# Patient Record
Sex: MALE | Race: WHITE | NOT HISPANIC OR LATINO | Employment: PART TIME | URBAN - NONMETROPOLITAN AREA
[De-identification: names, ages, dates, MRNs, and addresses within clinical notes are randomized per-mention and may not be internally consistent; named-entity substitution may affect disease eponyms.]

---

## 2017-01-18 ENCOUNTER — HOSPITAL ENCOUNTER (OUTPATIENT)
Dept: GASTROENTEROLOGY | Facility: HOSPITAL | Age: 62
Setting detail: HOSPITAL OUTPATIENT SURGERY
Discharge: HOME | End: 2017-01-18
Attending: SURGERY | Admitting: SURGERY

## 2017-05-16 ENCOUNTER — LAB (OUTPATIENT)
Dept: LAB | Facility: OTHER | Age: 62
End: 2017-05-16

## 2017-05-16 DIAGNOSIS — E78.2 MIXED HYPERLIPIDEMIA: Chronic | ICD-10-CM

## 2017-05-16 DIAGNOSIS — E11.9 TYPE 2 DIABETES MELLITUS WITHOUT COMPLICATION, WITHOUT LONG-TERM CURRENT USE OF INSULIN (HCC): ICD-10-CM

## 2017-05-16 DIAGNOSIS — I10 ESSENTIAL HYPERTENSION: ICD-10-CM

## 2017-05-16 DIAGNOSIS — Z12.5 SCREENING FOR PROSTATE CANCER: ICD-10-CM

## 2017-05-16 LAB
ALBUMIN SERPL-MCNC: 4.4 G/DL (ref 3.2–5.5)
ALBUMIN UR-MCNC: 0.9 MG/L
ALBUMIN/GLOB SERPL: 1.6 G/DL (ref 1–3)
ALP SERPL-CCNC: 72 U/L (ref 15–121)
ALT SERPL W P-5'-P-CCNC: 18 U/L (ref 10–60)
ANION GAP SERPL CALCULATED.3IONS-SCNC: 8 MMOL/L (ref 5–15)
AST SERPL-CCNC: 17 U/L (ref 10–60)
BACTERIA UR QL AUTO: ABNORMAL /HPF
BASOPHILS # BLD AUTO: 0.01 10*3/MM3 (ref 0–0.2)
BASOPHILS NFR BLD AUTO: 0.2 % (ref 0–2)
BILIRUB SERPL-MCNC: 1.4 MG/DL (ref 0.2–1)
BILIRUB UR QL STRIP: NEGATIVE
BUN BLD-MCNC: 19 MG/DL (ref 8–25)
BUN/CREAT SERPL: 19 (ref 7–25)
CALCIUM SPEC-SCNC: 9.3 MG/DL (ref 8.4–10.8)
CHLORIDE SERPL-SCNC: 103 MMOL/L (ref 100–112)
CHOLEST SERPL-MCNC: 145 MG/DL (ref 150–200)
CLARITY UR: CLEAR
CO2 SERPL-SCNC: 28 MMOL/L (ref 20–32)
COLOR UR: YELLOW
CREAT BLD-MCNC: 1 MG/DL (ref 0.4–1.3)
CREAT UR-MCNC: 145.4 MG/DL
DEPRECATED RDW RBC AUTO: 38.1 FL (ref 35.1–43.9)
EOSINOPHIL # BLD AUTO: 0.45 10*3/MM3 (ref 0–0.7)
EOSINOPHIL NFR BLD AUTO: 8 % (ref 0–7)
ERYTHROCYTE [DISTWIDTH] IN BLOOD BY AUTOMATED COUNT: 12.3 % (ref 11.5–14.5)
GFR SERPL CREATININE-BSD FRML MDRD: 75 ML/MIN/1.73 (ref 49–113)
GLOBULIN UR ELPH-MCNC: 2.7 GM/DL (ref 2.5–4.6)
GLUCOSE BLD-MCNC: 158 MG/DL (ref 70–100)
GLUCOSE UR STRIP-MCNC: NEGATIVE MG/DL
HBA1C MFR BLD: 8.08 % (ref 4–5.6)
HCT VFR BLD AUTO: 46 % (ref 39–49)
HDLC SERPL-MCNC: 48 MG/DL (ref 35–100)
HGB BLD-MCNC: 16.2 G/DL (ref 13.7–17.3)
HGB UR QL STRIP.AUTO: ABNORMAL
HYALINE CASTS UR QL AUTO: ABNORMAL /LPF
KETONES UR QL STRIP: ABNORMAL
LDLC SERPL CALC-MCNC: 85 MG/DL
LDLC/HDLC SERPL: 1.77 {RATIO}
LEUKOCYTE ESTERASE UR QL STRIP.AUTO: NEGATIVE
LYMPHOCYTES # BLD AUTO: 1.71 10*3/MM3 (ref 0.6–4.2)
LYMPHOCYTES NFR BLD AUTO: 30.2 % (ref 10–50)
MCH RBC QN AUTO: 30.1 PG (ref 26.5–34)
MCHC RBC AUTO-ENTMCNC: 35.2 G/DL (ref 31.5–36.3)
MCV RBC AUTO: 85.5 FL (ref 80–98)
MICROALBUMIN/CREAT UR: 6.2 MG/G (ref 0–30)
MONOCYTES # BLD AUTO: 0.63 10*3/MM3 (ref 0–0.9)
MONOCYTES NFR BLD AUTO: 11.1 % (ref 0–12)
MUCOUS THREADS URNS QL MICRO: ABNORMAL /HPF
NEUTROPHILS # BLD AUTO: 2.86 10*3/MM3 (ref 2–8.6)
NEUTROPHILS NFR BLD AUTO: 50.5 % (ref 37–80)
NITRITE UR QL STRIP: NEGATIVE
PH UR STRIP.AUTO: <=5 [PH] (ref 5.5–8)
PLATELET # BLD AUTO: 164 10*3/MM3 (ref 150–450)
PMV BLD AUTO: 9.8 FL (ref 8–12)
POTASSIUM BLD-SCNC: 4.1 MMOL/L (ref 3.4–5.4)
PROT SERPL-MCNC: 7.1 G/DL (ref 6.7–8.2)
PROT UR QL STRIP: NEGATIVE
RBC # BLD AUTO: 5.38 10*6/MM3 (ref 4.37–5.74)
RBC # UR: ABNORMAL /HPF
REF LAB TEST METHOD: ABNORMAL
SODIUM BLD-SCNC: 139 MMOL/L (ref 134–146)
SP GR UR STRIP: 1.02 (ref 1–1.03)
SQUAMOUS #/AREA URNS HPF: ABNORMAL /HPF
TRIGL SERPL-MCNC: 61 MG/DL (ref 35–160)
UROBILINOGEN UR QL STRIP: ABNORMAL
VLDLC SERPL-MCNC: 12.2 MG/DL
WBC NRBC COR # BLD: 5.66 10*3/MM3 (ref 3.2–9.8)
WBC UR QL AUTO: ABNORMAL /HPF

## 2017-05-16 PROCEDURE — 82570 ASSAY OF URINE CREATININE: CPT | Performed by: INTERNAL MEDICINE

## 2017-05-16 PROCEDURE — 82043 UR ALBUMIN QUANTITATIVE: CPT | Performed by: INTERNAL MEDICINE

## 2017-05-16 PROCEDURE — G0103 PSA SCREENING: HCPCS | Performed by: INTERNAL MEDICINE

## 2017-05-16 PROCEDURE — 83036 HEMOGLOBIN GLYCOSYLATED A1C: CPT | Performed by: INTERNAL MEDICINE

## 2017-05-16 PROCEDURE — 81001 URINALYSIS AUTO W/SCOPE: CPT | Performed by: INTERNAL MEDICINE

## 2017-05-16 PROCEDURE — 84443 ASSAY THYROID STIM HORMONE: CPT | Performed by: INTERNAL MEDICINE

## 2017-05-16 PROCEDURE — 84439 ASSAY OF FREE THYROXINE: CPT | Performed by: INTERNAL MEDICINE

## 2017-05-16 PROCEDURE — 85025 COMPLETE CBC W/AUTO DIFF WBC: CPT | Performed by: INTERNAL MEDICINE

## 2017-05-16 PROCEDURE — 80053 COMPREHEN METABOLIC PANEL: CPT | Performed by: INTERNAL MEDICINE

## 2017-05-16 PROCEDURE — 80061 LIPID PANEL: CPT | Performed by: INTERNAL MEDICINE

## 2017-05-17 LAB
PSA SERPL-MCNC: 1.44 NG/ML (ref 0–4)
T4 FREE SERPL-MCNC: 1.27 NG/DL (ref 0.78–2.19)
TSH SERPL DL<=0.05 MIU/L-ACNC: 2.94 MIU/ML (ref 0.46–4.68)

## 2017-05-23 ENCOUNTER — OFFICE VISIT (OUTPATIENT)
Dept: FAMILY MEDICINE CLINIC | Facility: CLINIC | Age: 62
End: 2017-05-23

## 2017-05-23 VITALS
SYSTOLIC BLOOD PRESSURE: 126 MMHG | WEIGHT: 140 LBS | DIASTOLIC BLOOD PRESSURE: 70 MMHG | HEIGHT: 67 IN | HEART RATE: 60 BPM | BODY MASS INDEX: 21.97 KG/M2

## 2017-05-23 DIAGNOSIS — E11.9 TYPE 2 DIABETES MELLITUS WITHOUT COMPLICATION, WITHOUT LONG-TERM CURRENT USE OF INSULIN (HCC): Primary | Chronic | ICD-10-CM

## 2017-05-23 DIAGNOSIS — Z11.59 NEED FOR HEPATITIS C SCREENING TEST: ICD-10-CM

## 2017-05-23 DIAGNOSIS — E78.2 MIXED HYPERLIPIDEMIA: Chronic | ICD-10-CM

## 2017-05-23 DIAGNOSIS — M79.672 PAIN OF LEFT HEEL: ICD-10-CM

## 2017-05-23 DIAGNOSIS — I10 ESSENTIAL HYPERTENSION: Chronic | ICD-10-CM

## 2017-05-23 PROCEDURE — 99214 OFFICE O/P EST MOD 30 MIN: CPT | Performed by: INTERNAL MEDICINE

## 2017-05-23 RX ORDER — ATENOLOL 25 MG/1
12.5 TABLET ORAL DAILY
Qty: 7 TABLET | Refills: 0 | Status: SHIPPED | OUTPATIENT
Start: 2017-05-23 | End: 2018-06-07

## 2017-05-23 RX ORDER — LOSARTAN POTASSIUM 50 MG/1
50 TABLET ORAL DAILY
Qty: 30 TABLET | Refills: 5 | Status: SHIPPED | OUTPATIENT
Start: 2017-05-23 | End: 2017-10-26 | Stop reason: SDUPTHER

## 2017-05-23 RX ORDER — GLIMEPIRIDE 4 MG/1
4 TABLET ORAL
Qty: 30 TABLET | Refills: 5 | Status: SHIPPED | OUTPATIENT
Start: 2017-05-23 | End: 2017-06-22 | Stop reason: DRUGHIGH

## 2017-06-22 ENCOUNTER — TELEPHONE (OUTPATIENT)
Dept: FAMILY MEDICINE CLINIC | Facility: CLINIC | Age: 62
End: 2017-06-22

## 2017-06-22 DIAGNOSIS — E11.9 TYPE 2 DIABETES MELLITUS WITHOUT COMPLICATION, WITHOUT LONG-TERM CURRENT USE OF INSULIN (HCC): Chronic | ICD-10-CM

## 2017-06-22 NOTE — TELEPHONE ENCOUNTER
Patient has been notified, will D/C Glimepiride and replace it with Januvia 50mg daily. Will monitor FSBS and notify the office with any concerns and effectiveness of the Januiva, verbalized understanding.

## 2017-06-22 NOTE — TELEPHONE ENCOUNTER
----- Message from Kayla Blanco sent at 6/22/2017 12:32 PM CDT -----  Regarding: about his medication  Contact: [phone]   Patient came to the window and said that the Glimepiride 4 mg that the Dr has him on, his sugar is dropping down to 67. He said that he cut the pill in half and took it for one week and his sugar was still 67?

## 2017-07-10 ENCOUNTER — OFFICE VISIT (OUTPATIENT)
Dept: PODIATRY | Facility: CLINIC | Age: 62
End: 2017-07-10

## 2017-07-10 VITALS — BODY MASS INDEX: 21.66 KG/M2 | WEIGHT: 138 LBS | HEIGHT: 67 IN

## 2017-07-10 DIAGNOSIS — M79.672 LEFT FOOT PAIN: ICD-10-CM

## 2017-07-10 DIAGNOSIS — M24.573 EQUINUS CONTRACTURE OF ANKLE: ICD-10-CM

## 2017-07-10 DIAGNOSIS — M72.2 PLANTAR FASCIITIS: Primary | ICD-10-CM

## 2017-07-10 PROCEDURE — 99203 OFFICE O/P NEW LOW 30 MIN: CPT | Performed by: PODIATRIST

## 2017-07-10 RX ORDER — MELOXICAM 15 MG/1
15 TABLET ORAL DAILY
Qty: 30 TABLET | Refills: 0 | Status: SHIPPED | OUTPATIENT
Start: 2017-07-10 | End: 2017-11-28

## 2017-07-10 NOTE — PROGRESS NOTES
Lui Miner  1955  62 y.o. male   Patient presents today with left heel pain.   PCP: Kaiser Hassan MD  BS: 139 on 7/8/2017    07/10/2017  Chief Complaint   Patient presents with   • Left Foot - Pain           History of Present Illness    Lui Miner is a 62 y.o. male who presents for evaluation of left foot pain.  States the pain is primarily located in his heel and arch.  States is been ongoing and worsening for several months.  He does have previous history of similar pain several years prior in the right foot which resolved following a corticosteroid injection.  He describes his pain as sharp and worse with weightbearing.  States that he has received injection in this left foot but not receive relief from this.  He denies any trauma or injuries.  He denies any other formal treatments.        Past Medical History:   Diagnosis Date   • Essential hypertension    • Mixed hyperlipidemia    • Type 2 diabetes mellitus without complication, without long-term current use of insulin          Past Surgical History:   Procedure Laterality Date   • COLONOSCOPY      Dr. Bynum   • HERNIA REPAIR     • INGUINAL HERNIA REPAIR Right    • KIDNEY STONE SURGERY      remove kidney stone and lithotripsy x 1         Family History   Problem Relation Age of Onset   • Breast cancer Mother    • Hypertension Mother    • Hypertension Sister    • Hypertension Sister          Social History     Social History   • Marital status:      Spouse name: N/A   • Number of children: N/A   • Years of education: N/A     Occupational History   • Not on file.     Social History Main Topics   • Smoking status: Never Smoker   • Smokeless tobacco: Never Used   • Alcohol use No   • Drug use: No   • Sexual activity: Defer     Other Topics Concern   • Not on file     Social History Narrative         Current Outpatient Prescriptions   Medication Sig Dispense Refill   • atenolol (TENORMIN) 25 MG tablet Take 0.5 tablets by mouth  "Daily. For one week and then stop. 7 tablet 0   • atorvastatin (LIPITOR) 20 MG tablet Take 20 mg by mouth Every Night.     • Blood Glucose Monitoring Suppl (ACURA BLOOD GLUCOSE METER) W/DEVICE kit to test FSBS 1 time per day as directed.DX:DM/E11.9, #1 glucometer, #50 strips, #100 lancets     • linagliptin (TRADJENTA) 5 MG tablet tablet Take 1 tablet by mouth Daily. 30 tablet 2   • losartan (COZAAR) 50 MG tablet Take 1 tablet by mouth Daily. 30 tablet 5   • meloxicam (MOBIC) 15 MG tablet Take 1 tablet by mouth Daily. Take once daily. 30 tablet 0     No current facility-administered medications for this visit.          OBJECTIVE    Ht 67\" (170.2 cm)  Wt 138 lb (62.6 kg)  BMI 21.61 kg/m2      Review of Systems   Constitutional:  Denies recent weight loss, weight gain, fever or chills, no change in exercise tolerance  Musculoskeletal: foot pain.   Skin: No wounds or lesions  Neurological: Denies paresthesias.  Psychiatric/Behavioral: Denies depression  Physical Exam   Constitutional: he appears well-developed and well-nourished.   HEENT: Normocephalic. Atraumatic.  CV: No CP. RRR  Resp: Non-labored respirations.  Psychiatric: he has a normal mood and affect. his behavior is normal.         Lower Extremity Exam:  Vascular: DP/PT pulses palpable 2+.   Minimal plantar heel edema, left  Foot warm  Neuro: Protective sensation intact, b/l.  DTRs intact  Negative Tinel over tarsal tunnel  Integument: No open wounds or lesions.  No erythema, scaling  No masses  Musculoskeletal: LE muscle strength 5/5.   Gait normal  Ankle ROM decreased without pain or crepitus  STJ ROM full without pain or crepitus  Pain on palpation of plantar calcaneal tubercles, medial plantar fascial band, left  Minimal tenderness to lateral compression of calcaneus, left            ASSESSMENT AND PLAN    Lui was seen today for pain.    Diagnoses and all orders for this visit:    Plantar fasciitis    Left foot pain    Equinus contracture of " ankle    Other orders  -     meloxicam (MOBIC) 15 MG tablet; Take 1 tablet by mouth Daily. Take once daily.    -Comprehensive foot and ankle exam performed  -Radiographs reviewed  -Educated pt on diagnosis, etiology and treatment of plantar fasciitis.  -Continue motion control shoe  -Recommend power step over-the-counter inserts  -Aggressive stretching regimen, patient education materials dispensed  -Rx Meloxicam 15 mg qday, not to be taken with other NSAIDs  -Corticosteroid injection in the future if necessary  -Recheck 4 weeks            This document has been electronically signed by Mannie Collins DPM on July 12, 2017 4:22 PM     EMR Dragon/Transcription disclaimer:   Much of this encounter note is an electronic transcription/translation of spoken language to printed text. The electronic translation of spoken language may permit erroneous, or at times, nonsensical words or phrases to be inadvertently transcribed; Although I have reviewed the note for such errors, some may still exist.    Mannie Collins DPM  7/12/2017  4:22 PM

## 2017-07-20 RX ORDER — ATORVASTATIN CALCIUM 20 MG/1
20 TABLET, FILM COATED ORAL NIGHTLY
Qty: 30 TABLET | Refills: 5 | Status: SHIPPED | OUTPATIENT
Start: 2017-07-20 | End: 2018-06-07 | Stop reason: SDUPTHER

## 2017-08-07 ENCOUNTER — OFFICE VISIT (OUTPATIENT)
Dept: PODIATRY | Facility: CLINIC | Age: 62
End: 2017-08-07

## 2017-08-07 VITALS — BODY MASS INDEX: 21.66 KG/M2 | HEIGHT: 67 IN | WEIGHT: 138 LBS

## 2017-08-07 DIAGNOSIS — M72.2 PLANTAR FASCIITIS: Primary | ICD-10-CM

## 2017-08-07 DIAGNOSIS — M24.573 EQUINUS CONTRACTURE OF ANKLE: ICD-10-CM

## 2017-08-07 PROCEDURE — 99212 OFFICE O/P EST SF 10 MIN: CPT | Performed by: PODIATRIST

## 2017-08-07 NOTE — PROGRESS NOTES
Lui Miner  1955  62 y.o. male   Patient presents today with left heel pain.   PCP: Kaiser Hassan MD  BS: 150 yesterday morning  08/07/2017    Chief Complaint   Patient presents with   • Left Foot - Follow-up           History of Present Illness    Lui Miner is a 62 y.o. male who presents for f/u evaluation of left foot pain related to plantar fasciitis. Doing well with no pain today.      Past Medical History:   Diagnosis Date   • Essential hypertension    • Mixed hyperlipidemia    • Type 2 diabetes mellitus without complication, without long-term current use of insulin          Past Surgical History:   Procedure Laterality Date   • COLONOSCOPY      Dr. Bynum   • HERNIA REPAIR     • INGUINAL HERNIA REPAIR Right    • KIDNEY STONE SURGERY      remove kidney stone and lithotripsy x 1         Family History   Problem Relation Age of Onset   • Breast cancer Mother    • Hypertension Mother    • Hypertension Sister    • Hypertension Sister          Social History     Social History   • Marital status:      Spouse name: N/A   • Number of children: N/A   • Years of education: N/A     Occupational History   • Not on file.     Social History Main Topics   • Smoking status: Never Smoker   • Smokeless tobacco: Never Used   • Alcohol use No   • Drug use: No   • Sexual activity: Defer     Other Topics Concern   • Not on file     Social History Narrative         Current Outpatient Prescriptions   Medication Sig Dispense Refill   • atenolol (TENORMIN) 25 MG tablet Take 0.5 tablets by mouth Daily. For one week and then stop. 7 tablet 0   • atorvastatin (LIPITOR) 20 MG tablet Take 1 tablet by mouth Every Night. 30 tablet 5   • Blood Glucose Monitoring Suppl (ACURA BLOOD GLUCOSE METER) W/DEVICE kit to test FSBS 1 time per day as directed.DX:DM/E11.9, #1 glucometer, #50 strips, #100 lancets     • linagliptin (TRADJENTA) 5 MG tablet tablet Take 1 tablet by mouth Daily. 30 tablet 2   • losartan (COZAAR)  "50 MG tablet Take 1 tablet by mouth Daily. 30 tablet 5   • meloxicam (MOBIC) 15 MG tablet Take 1 tablet by mouth Daily. Take once daily. 30 tablet 0     No current facility-administered medications for this visit.          OBJECTIVE    Ht 67\" (170.2 cm)  Wt 138 lb (62.6 kg)  BMI 21.61 kg/m2      Review of Systems   Constitutional:  Denies recent weight loss, weight gain, fever or chills, no change in exercise tolerance  Musculoskeletal: foot pain.   Skin: No wounds or lesions  Neurological: Denies paresthesias.  Psychiatric/Behavioral: Denies depression    Physical Exam   Constitutional: he appears well-developed and well-nourished.   HEENT: Normocephalic. Atraumatic.  CV: No CP. RRR  Resp: Non-labored respirations.  Psychiatric: he has a normal mood and affect. his behavior is normal.         Lower Extremity Exam:  Vascular: DP/PT pulses palpable 2+.   No edema  Foot warm  Neuro: Protective sensation intact, b/l.  DTRs intact  Negative Tinel over tarsal tunnel  Integument: No open wounds or lesions.  No erythema, scaling  No masses  Musculoskeletal: LE muscle strength 5/5.   Gait normal  Ankle ROM decreased without pain or crepitus  STJ ROM full without pain or crepitus  No pain on palpation of plantar calcaneal tubercles, medial plantar fascial band, left  Minimal tenderness to lateral compression of calcaneus, left            ASSESSMENT AND PLAN    Lui was seen today for follow-up.    Diagnoses and all orders for this visit:    Plantar fasciitis    Equinus contracture of ankle    -Comprehensive foot and ankle exam performed  -Educated pt on diagnosis, etiology and treatment of plantar fasciitis.  -Continue motion control shoe, power step over-the-counter inserts, stretching regimen.  -NSAIDs as needed  -Recheck as needed            This document has been electronically signed by Mannie Collins DPM on August 13, 2017 9:33 AM     EMR Dragon/Transcription disclaimer:   Much of this encounter note is an " electronic transcription/translation of spoken language to printed text. The electronic translation of spoken language may permit erroneous, or at times, nonsensical words or phrases to be inadvertently transcribed; Although I have reviewed the note for such errors, some may still exist.    Mannie Collins DPM  8/13/2017  9:33 AM

## 2017-10-26 DIAGNOSIS — I10 ESSENTIAL HYPERTENSION: Chronic | ICD-10-CM

## 2017-10-26 RX ORDER — LOSARTAN POTASSIUM 50 MG/1
TABLET ORAL
Qty: 30 TABLET | Refills: 5 | Status: SHIPPED | OUTPATIENT
Start: 2017-10-26 | End: 2018-05-31 | Stop reason: SDUPTHER

## 2017-11-21 ENCOUNTER — LAB (OUTPATIENT)
Dept: LAB | Facility: OTHER | Age: 62
End: 2017-11-21

## 2017-11-21 DIAGNOSIS — Z11.59 NEED FOR HEPATITIS C SCREENING TEST: ICD-10-CM

## 2017-11-21 DIAGNOSIS — E11.9 TYPE 2 DIABETES MELLITUS WITHOUT COMPLICATION, WITHOUT LONG-TERM CURRENT USE OF INSULIN (HCC): Chronic | ICD-10-CM

## 2017-11-21 DIAGNOSIS — I10 ESSENTIAL HYPERTENSION: ICD-10-CM

## 2017-11-21 DIAGNOSIS — E78.2 MIXED HYPERLIPIDEMIA: Chronic | ICD-10-CM

## 2017-11-21 LAB
ALBUMIN SERPL-MCNC: 4.3 G/DL (ref 3.2–5.5)
ALBUMIN/GLOB SERPL: 1.5 G/DL (ref 1–3)
ALP SERPL-CCNC: 67 U/L (ref 15–121)
ALT SERPL W P-5'-P-CCNC: 15 U/L (ref 10–60)
ANION GAP SERPL CALCULATED.3IONS-SCNC: 10 MMOL/L (ref 5–15)
AST SERPL-CCNC: 17 U/L (ref 10–60)
BASOPHILS # BLD AUTO: 0.03 10*3/MM3 (ref 0–0.2)
BASOPHILS NFR BLD AUTO: 0.5 % (ref 0–2)
BILIRUB SERPL-MCNC: 1 MG/DL (ref 0.2–1)
BILIRUB UR QL STRIP: NEGATIVE
BUN BLD-MCNC: 18 MG/DL (ref 8–25)
BUN/CREAT SERPL: 20 (ref 7–25)
CALCIUM SPEC-SCNC: 9.2 MG/DL (ref 8.4–10.8)
CHLORIDE SERPL-SCNC: 104 MMOL/L (ref 100–112)
CHOLEST SERPL-MCNC: 148 MG/DL (ref 150–200)
CLARITY UR: CLEAR
CO2 SERPL-SCNC: 28 MMOL/L (ref 20–32)
COLOR UR: YELLOW
CREAT BLD-MCNC: 0.9 MG/DL (ref 0.4–1.3)
DEPRECATED RDW RBC AUTO: 39.3 FL (ref 35.1–43.9)
EOSINOPHIL # BLD AUTO: 0.3 10*3/MM3 (ref 0–0.7)
EOSINOPHIL NFR BLD AUTO: 4.9 % (ref 0–7)
ERYTHROCYTE [DISTWIDTH] IN BLOOD BY AUTOMATED COUNT: 12.4 % (ref 11.5–14.5)
GFR SERPL CREATININE-BSD FRML MDRD: 85 ML/MIN/1.73 (ref 49–113)
GLOBULIN UR ELPH-MCNC: 2.8 GM/DL (ref 2.5–4.6)
GLUCOSE BLD-MCNC: 146 MG/DL (ref 70–100)
GLUCOSE UR STRIP-MCNC: ABNORMAL MG/DL
HBA1C MFR BLD: 7.5 % (ref 4–5.6)
HCT VFR BLD AUTO: 46 % (ref 39–49)
HDLC SERPL-MCNC: 48 MG/DL (ref 35–100)
HGB BLD-MCNC: 15.7 G/DL (ref 13.7–17.3)
HGB UR QL STRIP.AUTO: NEGATIVE
KETONES UR QL STRIP: ABNORMAL
LDLC SERPL CALC-MCNC: 85 MG/DL
LDLC/HDLC SERPL: 1.78 {RATIO}
LEUKOCYTE ESTERASE UR QL STRIP.AUTO: NEGATIVE
LYMPHOCYTES # BLD AUTO: 1.53 10*3/MM3 (ref 0.6–4.2)
LYMPHOCYTES NFR BLD AUTO: 25 % (ref 10–50)
MCH RBC QN AUTO: 29.9 PG (ref 26.5–34)
MCHC RBC AUTO-ENTMCNC: 34.1 G/DL (ref 31.5–36.3)
MCV RBC AUTO: 87.6 FL (ref 80–98)
MONOCYTES # BLD AUTO: 0.72 10*3/MM3 (ref 0–0.9)
MONOCYTES NFR BLD AUTO: 11.8 % (ref 0–12)
NEUTROPHILS # BLD AUTO: 3.53 10*3/MM3 (ref 2–8.6)
NEUTROPHILS NFR BLD AUTO: 57.8 % (ref 37–80)
NITRITE UR QL STRIP: NEGATIVE
PH UR STRIP.AUTO: 5.5 [PH] (ref 5.5–8)
PLATELET # BLD AUTO: 185 10*3/MM3 (ref 150–450)
PMV BLD AUTO: 9.7 FL (ref 8–12)
POTASSIUM BLD-SCNC: 4.6 MMOL/L (ref 3.4–5.4)
PROT SERPL-MCNC: 7.1 G/DL (ref 6.7–8.2)
PROT UR QL STRIP: NEGATIVE
RBC # BLD AUTO: 5.25 10*6/MM3 (ref 4.37–5.74)
SODIUM BLD-SCNC: 142 MMOL/L (ref 134–146)
SP GR UR STRIP: 1.02 (ref 1–1.03)
T4 FREE SERPL-MCNC: 1.24 NG/DL (ref 0.78–2.19)
TRIGL SERPL-MCNC: 73 MG/DL (ref 35–160)
TSH SERPL DL<=0.05 MIU/L-ACNC: 3.18 MIU/ML (ref 0.46–4.68)
UROBILINOGEN UR QL STRIP: ABNORMAL
VLDLC SERPL-MCNC: 14.6 MG/DL
WBC NRBC COR # BLD: 6.11 10*3/MM3 (ref 3.2–9.8)

## 2017-11-21 PROCEDURE — 84443 ASSAY THYROID STIM HORMONE: CPT | Performed by: INTERNAL MEDICINE

## 2017-11-21 PROCEDURE — 85025 COMPLETE CBC W/AUTO DIFF WBC: CPT | Performed by: INTERNAL MEDICINE

## 2017-11-21 PROCEDURE — 80061 LIPID PANEL: CPT | Performed by: INTERNAL MEDICINE

## 2017-11-21 PROCEDURE — 81003 URINALYSIS AUTO W/O SCOPE: CPT | Performed by: INTERNAL MEDICINE

## 2017-11-21 PROCEDURE — 80074 ACUTE HEPATITIS PANEL: CPT | Performed by: INTERNAL MEDICINE

## 2017-11-21 PROCEDURE — 84439 ASSAY OF FREE THYROXINE: CPT | Performed by: INTERNAL MEDICINE

## 2017-11-21 PROCEDURE — 80053 COMPREHEN METABOLIC PANEL: CPT | Performed by: INTERNAL MEDICINE

## 2017-11-21 PROCEDURE — 83036 HEMOGLOBIN GLYCOSYLATED A1C: CPT | Performed by: INTERNAL MEDICINE

## 2017-11-22 LAB
HAV IGM SERPL QL IA: NEGATIVE
HBV CORE IGM SERPL QL IA: NEGATIVE
HBV SURFACE AG SERPL QL IA: NEGATIVE
HCV AB SER DONR QL: NEGATIVE

## 2017-11-28 ENCOUNTER — OFFICE VISIT (OUTPATIENT)
Dept: FAMILY MEDICINE CLINIC | Facility: CLINIC | Age: 62
End: 2017-11-28

## 2017-11-28 VITALS
HEIGHT: 67 IN | TEMPERATURE: 97.8 F | HEART RATE: 64 BPM | WEIGHT: 144 LBS | BODY MASS INDEX: 22.6 KG/M2 | SYSTOLIC BLOOD PRESSURE: 126 MMHG | DIASTOLIC BLOOD PRESSURE: 76 MMHG

## 2017-11-28 DIAGNOSIS — Z12.5 SCREENING FOR PROSTATE CANCER: ICD-10-CM

## 2017-11-28 DIAGNOSIS — E78.2 MIXED HYPERLIPIDEMIA: Chronic | ICD-10-CM

## 2017-11-28 DIAGNOSIS — E11.9 TYPE 2 DIABETES MELLITUS WITHOUT COMPLICATION, WITHOUT LONG-TERM CURRENT USE OF INSULIN (HCC): Primary | Chronic | ICD-10-CM

## 2017-11-28 DIAGNOSIS — I10 ESSENTIAL HYPERTENSION: Chronic | ICD-10-CM

## 2017-11-28 PROCEDURE — 99214 OFFICE O/P EST MOD 30 MIN: CPT | Performed by: INTERNAL MEDICINE

## 2017-11-28 RX ORDER — GLIMEPIRIDE 1 MG/1
1 TABLET ORAL
Qty: 30 TABLET | Refills: 5 | Status: SHIPPED | OUTPATIENT
Start: 2017-11-28 | End: 2018-06-07 | Stop reason: SDUPTHER

## 2017-11-28 NOTE — PROGRESS NOTES
Chief Complaint   Patient presents with   • Hyperlipidemia   • Diabetes     checks fsbs q week and prn   • Hypertension     Subjective   Lui Miner is a 62 y.o. male who presents to the office for follow-up and review of labs. He has diabetes, and at the last visit his hemoglobin A1c was elevated.  He was having side effects from metformin, so this was discontinued.  I started him on glimepiride.  He was taking a 4 mg dose which was causing his blood sugars bottom out.  He then tried a 2 mg dose which did the same.  The glimepiride was discontinued and he was placed on Januvia.  The insurance then requested change to Tradjenta.  He has been tolerating the Tradjenta with no side effects.  He has noticed that his blood sugar is still running a little high at times.  He has hypertension and his blood pressure has been well controlled.  He has hyperlipidemia and continues to take Lipitor daily.    The following portions of the patient's history were reviewed and updated as appropriate: allergies, current medications, past family history, past medical history, past social history, past surgical history and problem list.    Review of Systems   Constitutional: Negative for chills, fatigue and fever.   HENT: Negative for congestion, sneezing, sore throat and trouble swallowing.    Eyes: Negative for visual disturbance.   Respiratory: Negative for cough, chest tightness, shortness of breath and wheezing.    Cardiovascular: Negative for chest pain, palpitations and leg swelling.   Gastrointestinal: Negative for abdominal pain, constipation, diarrhea, nausea and vomiting.   Genitourinary: Negative for dysuria, frequency and urgency.   Musculoskeletal: Negative for neck pain.   Skin: Negative for rash.   Neurological: Negative for dizziness, weakness and headaches.   Psychiatric/Behavioral:        Patient denies any feelings of depression and has not felt down, hopeless or lost interest in any activities.   All other  "systems reviewed and are negative.      Objective   Vitals:    11/28/17 0804   BP: 126/76   BP Location: Left arm   Patient Position: Sitting   Cuff Size: Adult   Pulse: 64   Temp: 97.8 °F (36.6 °C)   TempSrc: Oral   Weight: 144 lb (65.3 kg)   Height: 67\" (170.2 cm)   PainSc: 0-No pain     Physical Exam   Constitutional: He is oriented to person, place, and time. He appears well-developed and well-nourished. No distress.   HENT:   Head: Normocephalic and atraumatic.   Nose: Nose normal.   Mouth/Throat: Oropharynx is clear and moist. No oropharyngeal exudate.   Eyes: Conjunctivae and EOM are normal. Pupils are equal, round, and reactive to light. No scleral icterus.   Neck: Normal range of motion. Neck supple.   Cardiovascular: Normal rate, regular rhythm and normal heart sounds.  Exam reveals no gallop and no friction rub.    No murmur heard.  Pulmonary/Chest: Effort normal and breath sounds normal. No respiratory distress. He has no wheezes. He has no rales.   Abdominal: Soft. Bowel sounds are normal. He exhibits no distension. There is no tenderness. There is no rebound and no guarding.   Musculoskeletal: Normal range of motion. He exhibits no edema.    Lui had a diabetic foot exam performed today.   During the foot exam he had a monofilament test performed.    Neurological Sensory Findings - Unaltered hot/cold right ankle/foot discrimination and unaltered hot/cold left ankle/foot discrimination. Unaltered sharp/dull right ankle/foot discrimination and unaltered sharp/dull left ankle/foot discrimination. No right ankle/foot altered proprioception and no left ankle/foot altered proprioception    Vascular Status -  His exam exhibits right foot vasculature normal. His exam exhibits no right foot edema. His exam exhibits left foot vasculature normal. His exam exhibits no left foot edema.   Skin Integrity  -  His right foot skin is intact.     Lui 's left foot skin is intact. .   Foot Structure and Biomechanics - "  His right foot has no claw toes and no hammer toes present. His left foot has no claw toes and no hammer toes.      Lymphadenopathy:     He has no cervical adenopathy.   Neurological: He is alert and oriented to person, place, and time. No cranial nerve deficit.   Skin: Skin is warm and dry. No rash noted.   Psychiatric: He has a normal mood and affect. His behavior is normal. Judgment and thought content normal.   Nursing note and vitals reviewed.      Assessment/Plan   Lui was seen today for hyperlipidemia, diabetes and hypertension.    Diagnoses and all orders for this visit:    Type 2 diabetes mellitus without complication, without long-term current use of insulin  -     Hemoglobin A1c; Future  -     Microalbumin / Creatinine Urine Ratio - Urine, Clean Catch; Future    Essential hypertension  -     CBC & Differential; Future  -     Comprehensive Metabolic Panel; Future  -     T4, Free; Future  -     TSH; Future  -     Urinalysis With / Culture If Indicated - Urine, Clean Catch; Future    Mixed hyperlipidemia  -     Lipid Panel; Future    Screening for prostate cancer  -     PSA Screen; Future         Labs are reviewed with patient.  His glucose and hemoglobin A1c are still a little elevated, but it has improved quite a bit from the previous visit.  He will continue with the current dose of Tradjenta.  I will add glimepiride 1 mg to take 1/2-1 tablet daily.  He will let me know if this causes any hypoglycemic events.  I also discussed dietary measures to help with control of the diabetes.  He may monitor blood sugar one time daily.  His lipids are at goal and he will continue with Lipitor.  His blood pressure is well controlled and he will continue with losartan.    Patient does not want a flu shot today.  He also refuses pneumonia vaccine.  I have reminded him of the need for an annual diabetic eye exam.    PHQ-2/PHQ-9 Depression Screening 11/28/2017   Little interest or pleasure in doing things 0   Feeling  down, depressed, or hopeless 0   Trouble falling or staying asleep, or sleeping too much 0   Feeling tired or having little energy 0   Poor appetite or overeating 0   Feeling bad about yourself - or that you are a failure or have let yourself or your family down 0   Trouble concentrating on things, such as reading the newspaper or watching television 0   Moving or speaking so slowly that other people could have noticed. Or the opposite - being so fidgety or restless that you have been moving around a lot more than usual 0   Thoughts that you would be better off dead, or of hurting yourself in some way 0   Total Score 0         Lab on 11/21/2017   Component Date Value Ref Range Status   • Glucose 11/21/2017 146* 70 - 100 mg/dL Final   • BUN 11/21/2017 18  8 - 25 mg/dL Final   • Creatinine 11/21/2017 0.90  0.40 - 1.30 mg/dL Final   • Sodium 11/21/2017 142  134 - 146 mmol/L Final   • Potassium 11/21/2017 4.6  3.4 - 5.4 mmol/L Final   • Chloride 11/21/2017 104  100 - 112 mmol/L Final   • CO2 11/21/2017 28.0  20.0 - 32.0 mmol/L Final   • Calcium 11/21/2017 9.2  8.4 - 10.8 mg/dL Final   • Total Protein 11/21/2017 7.1  6.7 - 8.2 g/dL Final   • Albumin 11/21/2017 4.30  3.20 - 5.50 g/dL Final   • ALT (SGPT) 11/21/2017 15  10 - 60 U/L Final   • AST (SGOT) 11/21/2017 17  10 - 60 U/L Final   • Alkaline Phosphatase 11/21/2017 67  15 - 121 U/L Final   • Total Bilirubin 11/21/2017 1.0  0.2 - 1.0 mg/dL Final   • eGFR Non African Amer 11/21/2017 85  49 - 113 mL/min/1.73 Final   • Globulin 11/21/2017 2.8  2.5 - 4.6 gm/dL Final   • A/G Ratio 11/21/2017 1.5  1.0 - 3.0 g/dL Final   • BUN/Creatinine Ratio 11/21/2017 20.0  7.0 - 25.0 Final   • Anion Gap 11/21/2017 10.0  5.0 - 15.0 mmol/L Final   • Hemoglobin A1C 11/21/2017 7.5* 4 - 5.6 % Final   • Hepatitis C Ab 11/21/2017 Negative  Negative Final   • Hep A IgM 11/21/2017 Negative  Negative Final   • Hep B C IgM 11/21/2017 Negative  Negative Final   • Hepatitis B Surface Ag 11/21/2017  Negative  Negative Final   • Total Cholesterol 11/21/2017 148* 150 - 200 mg/dL Final   • Triglycerides 11/21/2017 73  35 - 160 mg/dL Final   • HDL Cholesterol 11/21/2017 48  35 - 100 mg/dL Final   • LDL Cholesterol  11/21/2017 85  mg/dL Final   • VLDL Cholesterol 11/21/2017 14.6  mg/dL Final   • LDL/HDL Ratio 11/21/2017 1.78   Final   • Free T4 11/21/2017 1.24  0.78 - 2.19 ng/dL Final   • TSH 11/21/2017 3.180  0.460 - 4.680 mIU/mL Final   • Color, UA 11/21/2017 Yellow  Yellow, Straw Final   • Appearance, UA 11/21/2017 Clear  Clear Final   • pH, UA 11/21/2017 5.5  5.5 - 8.0 Final   • Specific Gravity, UA 11/21/2017 1.025  1.005 - 1.030 Final   • Glucose, UA 11/21/2017 250 mg/dL (1+)* Negative Final   • Ketones, UA 11/21/2017 Trace* Negative Final   • Bilirubin, UA 11/21/2017 Negative  Negative Final   • Blood, UA 11/21/2017 Negative  Negative Final   • Protein, UA 11/21/2017 Negative  Negative Final   • Leuk Esterase, UA 11/21/2017 Negative  Negative Final   • Nitrite, UA 11/21/2017 Negative  Negative Final   • Urobilinogen, UA 11/21/2017 0.2 E.U./dL  0.2 - 1.0 E.U./dL Final   • WBC 11/21/2017 6.11  3.20 - 9.80 10*3/mm3 Final   • RBC 11/21/2017 5.25  4.37 - 5.74 10*6/mm3 Final   • Hemoglobin 11/21/2017 15.7  13.7 - 17.3 g/dL Final   • Hematocrit 11/21/2017 46.0  39.0 - 49.0 % Final   • MCV 11/21/2017 87.6  80.0 - 98.0 fL Final   • MCH 11/21/2017 29.9  26.5 - 34.0 pg Final   • MCHC 11/21/2017 34.1  31.5 - 36.3 g/dL Final   • RDW 11/21/2017 12.4  11.5 - 14.5 % Final   • RDW-SD 11/21/2017 39.3  35.1 - 43.9 fl Final   • MPV 11/21/2017 9.7  8.0 - 12.0 fL Final   • Platelets 11/21/2017 185  150 - 450 10*3/mm3 Final   • Neutrophil % 11/21/2017 57.8  37.0 - 80.0 % Final   • Lymphocyte % 11/21/2017 25.0  10.0 - 50.0 % Final   • Monocyte % 11/21/2017 11.8  0.0 - 12.0 % Final   • Eosinophil % 11/21/2017 4.9  0.0 - 7.0 % Final   • Basophil % 11/21/2017 0.5  0.0 - 2.0 % Final   • Neutrophils, Absolute 11/21/2017 3.53  2.00 - 8.60  10*3/mm3 Final   • Lymphocytes, Absolute 11/21/2017 1.53  0.60 - 4.20 10*3/mm3 Final   • Monocytes, Absolute 11/21/2017 0.72  0.00 - 0.90 10*3/mm3 Final   • Eosinophils, Absolute 11/21/2017 0.30  0.00 - 0.70 10*3/mm3 Final   • Basophils, Absolute 11/21/2017 0.03  0.00 - 0.20 10*3/mm3 Final   ]

## 2017-12-14 RX ORDER — LINAGLIPTIN 5 MG/1
TABLET, FILM COATED ORAL
Qty: 30 TABLET | Refills: 2 | Status: SHIPPED | OUTPATIENT
Start: 2017-12-14 | End: 2018-03-16 | Stop reason: SDUPTHER

## 2018-03-16 RX ORDER — LINAGLIPTIN 5 MG/1
TABLET, FILM COATED ORAL
Qty: 30 TABLET | Refills: 2 | Status: SHIPPED | OUTPATIENT
Start: 2018-03-16 | End: 2018-06-07 | Stop reason: SDUPTHER

## 2018-05-30 ENCOUNTER — LAB (OUTPATIENT)
Dept: LAB | Facility: OTHER | Age: 63
End: 2018-05-30

## 2018-05-30 DIAGNOSIS — E11.9 TYPE 2 DIABETES MELLITUS WITHOUT COMPLICATION, WITHOUT LONG-TERM CURRENT USE OF INSULIN (HCC): Chronic | ICD-10-CM

## 2018-05-30 DIAGNOSIS — I10 ESSENTIAL HYPERTENSION: ICD-10-CM

## 2018-05-30 DIAGNOSIS — Z12.5 SCREENING FOR PROSTATE CANCER: ICD-10-CM

## 2018-05-30 DIAGNOSIS — E78.2 MIXED HYPERLIPIDEMIA: Chronic | ICD-10-CM

## 2018-05-30 LAB
ALBUMIN SERPL-MCNC: 4.2 G/DL (ref 3.2–5.5)
ALBUMIN UR-MCNC: 1.3 MG/L
ALBUMIN/GLOB SERPL: 1.6 G/DL (ref 1–3)
ALP SERPL-CCNC: 69 U/L (ref 15–121)
ALT SERPL W P-5'-P-CCNC: 17 U/L (ref 10–60)
ANION GAP SERPL CALCULATED.3IONS-SCNC: 9 MMOL/L (ref 5–15)
AST SERPL-CCNC: 17 U/L (ref 10–60)
BILIRUB SERPL-MCNC: 0.9 MG/DL (ref 0.2–1)
BILIRUB UR QL STRIP: NEGATIVE
BUN BLD-MCNC: 21 MG/DL (ref 8–25)
BUN/CREAT SERPL: 23.3 (ref 7–25)
CALCIUM SPEC-SCNC: 9.1 MG/DL (ref 8.4–10.8)
CHLORIDE SERPL-SCNC: 103 MMOL/L (ref 100–112)
CHOLEST SERPL-MCNC: 153 MG/DL (ref 150–200)
CLARITY UR: CLEAR
CO2 SERPL-SCNC: 28 MMOL/L (ref 20–32)
COLOR UR: YELLOW
CREAT BLD-MCNC: 0.9 MG/DL (ref 0.4–1.3)
CREAT UR-MCNC: 173.1 MG/DL
DEPRECATED RDW RBC AUTO: 40.8 FL (ref 35.1–43.9)
EOSINOPHIL # BLD MANUAL: 0.15 10*3/MM3 (ref 0–0.7)
EOSINOPHIL NFR BLD MANUAL: 2 % (ref 0–7)
ERYTHROCYTE [DISTWIDTH] IN BLOOD BY AUTOMATED COUNT: 12.7 % (ref 11.5–14.5)
GFR SERPL CREATININE-BSD FRML MDRD: 85 ML/MIN/1.73 (ref 49–113)
GLOBULIN UR ELPH-MCNC: 2.7 GM/DL (ref 2.5–4.6)
GLUCOSE BLD-MCNC: 138 MG/DL (ref 70–100)
GLUCOSE UR STRIP-MCNC: NEGATIVE MG/DL
HBA1C MFR BLD: 6.8 % (ref 4–5.6)
HCT VFR BLD AUTO: 45.9 % (ref 39–49)
HDLC SERPL-MCNC: 53 MG/DL (ref 35–100)
HGB BLD-MCNC: 15.9 G/DL (ref 13.7–17.3)
HGB UR QL STRIP.AUTO: NEGATIVE
KETONES UR QL STRIP: NEGATIVE
LDLC SERPL CALC-MCNC: 86 MG/DL
LDLC/HDLC SERPL: 1.62 {RATIO}
LEUKOCYTE ESTERASE UR QL STRIP.AUTO: NEGATIVE
LYMPHOCYTES # BLD MANUAL: 1.38 10*3/MM3 (ref 0.6–4.2)
LYMPHOCYTES NFR BLD MANUAL: 10 % (ref 0–12)
LYMPHOCYTES NFR BLD MANUAL: 19 % (ref 10–50)
MCH RBC QN AUTO: 30.5 PG (ref 26.5–34)
MCHC RBC AUTO-ENTMCNC: 34.6 G/DL (ref 31.5–36.3)
MCV RBC AUTO: 88.1 FL (ref 80–98)
MICROALBUMIN/CREAT UR: 7.5 MG/G (ref 0–30)
MONOCYTES # BLD AUTO: 0.73 10*3/MM3 (ref 0–0.9)
NEUTROPHILS # BLD AUTO: 5 10*3/MM3 (ref 2–8.6)
NEUTROPHILS NFR BLD MANUAL: 64 % (ref 37–80)
NEUTS BAND NFR BLD MANUAL: 5 % (ref 0–5)
NITRITE UR QL STRIP: NEGATIVE
PH UR STRIP.AUTO: 6 [PH] (ref 5.5–8)
PLATELET # BLD AUTO: 173 10*3/MM3 (ref 150–450)
PMV BLD AUTO: 9.8 FL (ref 8–12)
POTASSIUM BLD-SCNC: 3.8 MMOL/L (ref 3.4–5.4)
PROT SERPL-MCNC: 6.9 G/DL (ref 6.7–8.2)
PROT UR QL STRIP: NEGATIVE
PSA SERPL-MCNC: 2.2 NG/ML (ref 0–4)
RBC # BLD AUTO: 5.21 10*6/MM3 (ref 4.37–5.74)
RBC MORPH BLD: NORMAL
SMALL PLATELETS BLD QL SMEAR: ADEQUATE
SODIUM BLD-SCNC: 140 MMOL/L (ref 134–146)
SP GR UR STRIP: 1.02 (ref 1–1.03)
T4 FREE SERPL-MCNC: 1.12 NG/DL (ref 0.78–2.19)
TRIGL SERPL-MCNC: 72 MG/DL (ref 35–160)
TSH SERPL DL<=0.05 MIU/L-ACNC: 3.25 MIU/ML (ref 0.46–4.68)
UROBILINOGEN UR QL STRIP: NORMAL
VLDLC SERPL-MCNC: 14.4 MG/DL
WBC MORPH BLD: NORMAL
WBC NRBC COR # BLD: 7.25 10*3/MM3 (ref 3.2–9.8)

## 2018-05-30 PROCEDURE — 81003 URINALYSIS AUTO W/O SCOPE: CPT | Performed by: INTERNAL MEDICINE

## 2018-05-30 PROCEDURE — G0103 PSA SCREENING: HCPCS | Performed by: INTERNAL MEDICINE

## 2018-05-30 PROCEDURE — 80061 LIPID PANEL: CPT | Performed by: INTERNAL MEDICINE

## 2018-05-30 PROCEDURE — 85025 COMPLETE CBC W/AUTO DIFF WBC: CPT | Performed by: INTERNAL MEDICINE

## 2018-05-30 PROCEDURE — 82570 ASSAY OF URINE CREATININE: CPT | Performed by: INTERNAL MEDICINE

## 2018-05-30 PROCEDURE — 84443 ASSAY THYROID STIM HORMONE: CPT | Performed by: INTERNAL MEDICINE

## 2018-05-30 PROCEDURE — 80053 COMPREHEN METABOLIC PANEL: CPT | Performed by: INTERNAL MEDICINE

## 2018-05-30 PROCEDURE — 83036 HEMOGLOBIN GLYCOSYLATED A1C: CPT | Performed by: INTERNAL MEDICINE

## 2018-05-30 PROCEDURE — 82043 UR ALBUMIN QUANTITATIVE: CPT | Performed by: INTERNAL MEDICINE

## 2018-05-30 PROCEDURE — 84439 ASSAY OF FREE THYROXINE: CPT | Performed by: INTERNAL MEDICINE

## 2018-05-31 DIAGNOSIS — I10 ESSENTIAL HYPERTENSION: Chronic | ICD-10-CM

## 2018-05-31 RX ORDER — LOSARTAN POTASSIUM 50 MG/1
TABLET ORAL
Qty: 30 TABLET | Refills: 0 | Status: SHIPPED | OUTPATIENT
Start: 2018-05-31 | End: 2018-06-07 | Stop reason: SDUPTHER

## 2018-06-07 ENCOUNTER — OFFICE VISIT (OUTPATIENT)
Dept: FAMILY MEDICINE CLINIC | Facility: CLINIC | Age: 63
End: 2018-06-07

## 2018-06-07 VITALS
HEIGHT: 67 IN | HEART RATE: 78 BPM | DIASTOLIC BLOOD PRESSURE: 78 MMHG | SYSTOLIC BLOOD PRESSURE: 122 MMHG | BODY MASS INDEX: 22.29 KG/M2 | WEIGHT: 142 LBS

## 2018-06-07 DIAGNOSIS — E78.2 MIXED HYPERLIPIDEMIA: Chronic | ICD-10-CM

## 2018-06-07 DIAGNOSIS — L50.9 HIVES: ICD-10-CM

## 2018-06-07 DIAGNOSIS — E11.9 TYPE 2 DIABETES MELLITUS WITHOUT COMPLICATION, WITHOUT LONG-TERM CURRENT USE OF INSULIN (HCC): Primary | Chronic | ICD-10-CM

## 2018-06-07 DIAGNOSIS — I10 ESSENTIAL HYPERTENSION: Chronic | ICD-10-CM

## 2018-06-07 PROCEDURE — 99214 OFFICE O/P EST MOD 30 MIN: CPT | Performed by: INTERNAL MEDICINE

## 2018-06-07 RX ORDER — ATORVASTATIN CALCIUM 20 MG/1
10 TABLET, FILM COATED ORAL NIGHTLY
Qty: 45 TABLET | Refills: 3 | Status: SHIPPED | OUTPATIENT
Start: 2018-06-07 | End: 2019-06-06 | Stop reason: SDUPTHER

## 2018-06-07 RX ORDER — GLIMEPIRIDE 1 MG/1
0.5 TABLET ORAL
Qty: 45 TABLET | Refills: 3 | Status: SHIPPED | OUTPATIENT
Start: 2018-06-07 | End: 2018-11-27 | Stop reason: SDUPTHER

## 2018-06-07 RX ORDER — LOSARTAN POTASSIUM 50 MG/1
50 TABLET ORAL DAILY
Qty: 30 TABLET | Refills: 5 | Status: SHIPPED | OUTPATIENT
Start: 2018-06-07 | End: 2018-12-17 | Stop reason: SDUPTHER

## 2018-06-07 RX ORDER — PREDNISONE 10 MG/1
10 TABLET ORAL DAILY
Qty: 6 TABLET | Refills: 0 | Status: SHIPPED | OUTPATIENT
Start: 2018-06-07 | End: 2018-12-06

## 2018-06-07 NOTE — PROGRESS NOTES
Chief Complaint   Patient presents with   • Hypertension   • Hyperlipidemia   • Diabetes   • Urticaria     Subjective   Lui Miner is a 63 y.o. male who presents to the office for follow-up and review of labs. He has diabetes, and His blood sugar has been doing well.  He is currently taking Tradjenta and glimepiride.  He is tolerating these with no side effects. He has hypertension and his blood pressure has been well controlled.  He has hyperlipidemia and continues to take Lipitor daily.     He complains of episodes of hives and itching.  He had this approximately 10 years ago.  He had testing at that time and it was never determined what was causing this.  Back at that time, he saw a specialist and even held his medications one at a time to see if any of these could be causing it.  After a few rounds of steroids, his symptoms went away.  He has not been bothered with it until the past couple of months.  He had another mild episode of hives and was treated with prednisone in the urgent care.  The hives resolved, but now a few weeks later they are starting to reappear.  He denies any other symptoms associated with them.  He reports that no medications have changed.  He has not been doing any different activities or had any new soaps, detergents or clothing.    The following portions of the patient's history were reviewed and updated as appropriate: allergies, current medications, past family history, past medical history, past social history, past surgical history and problem list.    Review of Systems   Constitutional: Negative for chills, fatigue and fever.   HENT: Negative for congestion, sneezing, sore throat and trouble swallowing.    Eyes: Negative for visual disturbance.   Respiratory: Negative for cough, chest tightness, shortness of breath and wheezing.    Cardiovascular: Negative for chest pain, palpitations and leg swelling.   Gastrointestinal: Negative for abdominal pain, constipation, diarrhea,  "nausea and vomiting.   Genitourinary: Negative for dysuria, frequency and urgency.   Musculoskeletal: Negative for neck pain.   Skin: Positive for rash.   Neurological: Negative for dizziness, weakness and headaches.   Psychiatric/Behavioral:        Patient denies any feelings of depression and has not felt down, hopeless or lost interest in any activities.   All other systems reviewed and are negative.      Objective   Vitals:    06/07/18 0842   BP: 122/78   BP Location: Left arm   Patient Position: Sitting   Cuff Size: Adult   Pulse: 78   Weight: 64.4 kg (142 lb)   Height: 170.2 cm (67\")   PainSc: 0-No pain     Physical Exam   Constitutional: He is oriented to person, place, and time. He appears well-developed and well-nourished. No distress.   HENT:   Head: Normocephalic and atraumatic.   Nose: Nose normal.   Mouth/Throat: Oropharynx is clear and moist. No oropharyngeal exudate.   Eyes: Conjunctivae and EOM are normal. Pupils are equal, round, and reactive to light. No scleral icterus.   Neck: Normal range of motion. Neck supple.   Cardiovascular: Normal rate, regular rhythm and normal heart sounds.  Exam reveals no gallop and no friction rub.    No murmur heard.  Pulmonary/Chest: Effort normal and breath sounds normal. No respiratory distress. He has no wheezes. He has no rales.   Abdominal: Soft. Bowel sounds are normal. He exhibits no distension. There is no tenderness. There is no rebound and no guarding.   Musculoskeletal: Normal range of motion. He exhibits no edema.   Lymphadenopathy:     He has no cervical adenopathy.   Neurological: He is alert and oriented to person, place, and time. No cranial nerve deficit.   Skin: Skin is warm and dry. No rash noted.   Psychiatric: He has a normal mood and affect. His behavior is normal. Judgment and thought content normal.   Nursing note and vitals reviewed.      Assessment/Plan   Lui was seen today for hypertension, hyperlipidemia, diabetes and " urticaria.    Diagnoses and all orders for this visit:    Type 2 diabetes mellitus without complication, without long-term current use of insulin  -     Hemoglobin A1c; Future  -     linagliptin (TRADJENTA) 5 MG tablet tablet; Take 1 tablet by mouth Daily.  -     glimepiride (AMARYL) 1 MG tablet; Take 0.5 tablets by mouth Every Morning Before Breakfast.    Essential hypertension  -     CBC & Differential; Future  -     Comprehensive Metabolic Panel; Future  -     T4, Free; Future  -     TSH; Future  -     Urinalysis With / Culture If Indicated - Urine, Clean Catch; Future  -     losartan (COZAAR) 50 MG tablet; Take 1 tablet by mouth Daily.    Mixed hyperlipidemia  -     LDL Cholesterol, Direct; Future  -     atorvastatin (LIPITOR) 20 MG tablet; Take 0.5 tablets by mouth Every Night.    Hives  -     predniSONE (DELTASONE) 10 MG tablet; Take 1 tablet by mouth Daily.         Labs are reviewed with patient.  His glucose and hemoglobin A1c show good control of his diabetes.  He will continue with the current dose of Tradjenta and glimepiride. I also discussed dietary measures to help with control of the diabetes.  He may monitor blood sugar one time daily.  His lipids are at goal and he will continue with Lipitor.  His blood pressure is well controlled and he will continue with losartan.  He is given prednisone for treatment of the hives.  If this does not resolve, I will refer him to an allergist for further evaluation.    I have reminded him of the need for an annual diabetic eye exam.    PHQ-2/PHQ-9 Depression Screening 6/7/2018   Little interest or pleasure in doing things 0   Feeling down, depressed, or hopeless 0   Trouble falling or staying asleep, or sleeping too much -   Feeling tired or having little energy -   Poor appetite or overeating -   Feeling bad about yourself - or that you are a failure or have let yourself or your family down -   Trouble concentrating on things, such as reading the newspaper or  watching television -   Moving or speaking so slowly that other people could have noticed. Or the opposite - being so fidgety or restless that you have been moving around a lot more than usual -   Thoughts that you would be better off dead, or of hurting yourself in some way -   Total Score 0         Lab on 05/30/2018   Component Date Value Ref Range Status   • Glucose 05/30/2018 138* 70 - 100 mg/dL Final   • BUN 05/30/2018 21  8 - 25 mg/dL Final   • Creatinine 05/30/2018 0.90  0.40 - 1.30 mg/dL Final   • Sodium 05/30/2018 140  134 - 146 mmol/L Final   • Potassium 05/30/2018 3.8  3.4 - 5.4 mmol/L Final   • Chloride 05/30/2018 103  100 - 112 mmol/L Final   • CO2 05/30/2018 28.0  20.0 - 32.0 mmol/L Final   • Calcium 05/30/2018 9.1  8.4 - 10.8 mg/dL Final   • Total Protein 05/30/2018 6.9  6.7 - 8.2 g/dL Final   • Albumin 05/30/2018 4.20  3.20 - 5.50 g/dL Final   • ALT (SGPT) 05/30/2018 17  10 - 60 U/L Final   • AST (SGOT) 05/30/2018 17  10 - 60 U/L Final   • Alkaline Phosphatase 05/30/2018 69  15 - 121 U/L Final   • Total Bilirubin 05/30/2018 0.9  0.2 - 1.0 mg/dL Final   • eGFR Non African Amer 05/30/2018 85  49 - 113 mL/min/1.73 Final   • Globulin 05/30/2018 2.7  2.5 - 4.6 gm/dL Final   • A/G Ratio 05/30/2018 1.6  1.0 - 3.0 g/dL Final   • BUN/Creatinine Ratio 05/30/2018 23.3  7.0 - 25.0 Final   • Anion Gap 05/30/2018 9.0  5.0 - 15.0 mmol/L Final   • Hemoglobin A1C 05/30/2018 6.8* 4 - 5.6 % Final   • Total Cholesterol 05/30/2018 153  150 - 200 mg/dL Final   • Triglycerides 05/30/2018 72  35 - 160 mg/dL Final   • HDL Cholesterol 05/30/2018 53  35 - 100 mg/dL Final   • LDL Cholesterol  05/30/2018 86  mg/dL Final   • VLDL Cholesterol 05/30/2018 14.4  mg/dL Final   • LDL/HDL Ratio 05/30/2018 1.62   Final   • Microalbumin/Creatinine Ratio 05/30/2018 7.5  0.0 - 30.0 mg/g Final   • Creatinine, Urine 05/30/2018 173.1  mg/dL Final   • Microalbumin, Urine 05/30/2018 1.3  mg/L Final   • PSA 05/30/2018 2.200  0.000 - 4.000 ng/mL  Final   • Free T4 05/30/2018 1.12  0.78 - 2.19 ng/dL Final   • TSH 05/30/2018 3.250  0.460 - 4.680 mIU/mL Final   • Color, UA 05/30/2018 Yellow  Yellow, Straw Final   • Appearance, UA 05/30/2018 Clear  Clear Final   • pH, UA 05/30/2018 6.0  5.5 - 8.0 Final   • Specific Gravity, UA 05/30/2018 1.025  1.005 - 1.030 Final   • Glucose, UA 05/30/2018 Negative  Negative Final   • Ketones, UA 05/30/2018 Negative  Negative Final   • Bilirubin, UA 05/30/2018 Negative  Negative Final   • Blood, UA 05/30/2018 Negative  Negative Final   • Protein, UA 05/30/2018 Negative  Negative Final   • Leuk Esterase, UA 05/30/2018 Negative  Negative Final   • Nitrite, UA 05/30/2018 Negative  Negative Final   • Urobilinogen, UA 05/30/2018 0.2 E.U./dL  0.2 - 1.0 E.U./dL Final   • WBC 05/30/2018 7.25  3.20 - 9.80 10*3/mm3 Final   • RBC 05/30/2018 5.21  4.37 - 5.74 10*6/mm3 Final   • Hemoglobin 05/30/2018 15.9  13.7 - 17.3 g/dL Final   • Hematocrit 05/30/2018 45.9  39.0 - 49.0 % Final   • MCV 05/30/2018 88.1  80.0 - 98.0 fL Final   • MCH 05/30/2018 30.5  26.5 - 34.0 pg Final   • MCHC 05/30/2018 34.6  31.5 - 36.3 g/dL Final   • RDW 05/30/2018 12.7  11.5 - 14.5 % Final   • RDW-SD 05/30/2018 40.8  35.1 - 43.9 fl Final   • MPV 05/30/2018 9.8  8.0 - 12.0 fL Final   • Platelets 05/30/2018 173  150 - 450 10*3/mm3 Final   • Neutrophil % 05/30/2018 64.0  37.0 - 80.0 % Final   • Lymphocyte % 05/30/2018 19.0  10.0 - 50.0 % Final   • Monocyte % 05/30/2018 10.0  0.0 - 12.0 % Final   • Eosinophil % 05/30/2018 2.0  0.0 - 7.0 % Final   • Bands %  05/30/2018 5.0  0.0 - 5.0 % Final   • Neutrophils Absolute 05/30/2018 5.00  2.00 - 8.60 10*3/mm3 Final   • Lymphocytes Absolute 05/30/2018 1.38  0.60 - 4.20 10*3/mm3 Final   • Monocytes Absolute 05/30/2018 0.73  0.00 - 0.90 10*3/mm3 Final   • Eosinophils Absolute 05/30/2018 0.15  0.00 - 0.70 10*3/mm3 Final   • RBC Morphology 05/30/2018 Normal  Normal Final   • WBC Morphology 05/30/2018 Normal  Normal Final   • Platelet  Estimate 05/30/2018 Adequate  Normal Final   ]

## 2018-08-30 ENCOUNTER — APPOINTMENT (OUTPATIENT)
Dept: LAB | Facility: HOSPITAL | Age: 63
End: 2018-08-30

## 2018-08-30 ENCOUNTER — OFFICE VISIT (OUTPATIENT)
Dept: PULMONOLOGY | Facility: CLINIC | Age: 63
End: 2018-08-30

## 2018-08-30 VITALS
DIASTOLIC BLOOD PRESSURE: 74 MMHG | HEART RATE: 70 BPM | BODY MASS INDEX: 21.82 KG/M2 | OXYGEN SATURATION: 96 % | HEIGHT: 67 IN | WEIGHT: 139 LBS | SYSTOLIC BLOOD PRESSURE: 116 MMHG

## 2018-08-30 DIAGNOSIS — L50.9 URTICARIA: Primary | ICD-10-CM

## 2018-08-30 PROCEDURE — 99204 OFFICE O/P NEW MOD 45 MIN: CPT | Performed by: INTERNAL MEDICINE

## 2018-08-30 PROCEDURE — 96372 THER/PROPH/DIAG INJ SC/IM: CPT | Performed by: INTERNAL MEDICINE

## 2018-08-30 PROCEDURE — 86160 COMPLEMENT ANTIGEN: CPT | Performed by: INTERNAL MEDICINE

## 2018-08-30 PROCEDURE — 86430 RHEUMATOID FACTOR TEST QUAL: CPT | Performed by: INTERNAL MEDICINE

## 2018-08-30 PROCEDURE — 86376 MICROSOMAL ANTIBODY EACH: CPT | Performed by: INTERNAL MEDICINE

## 2018-08-30 PROCEDURE — 86038 ANTINUCLEAR ANTIBODIES: CPT | Performed by: INTERNAL MEDICINE

## 2018-08-30 PROCEDURE — 36415 COLL VENOUS BLD VENIPUNCTURE: CPT | Performed by: INTERNAL MEDICINE

## 2018-08-30 RX ORDER — METHYLPREDNISOLONE ACETATE 40 MG/ML
80 INJECTION, SUSPENSION INTRA-ARTICULAR; INTRALESIONAL; INTRAMUSCULAR; SOFT TISSUE ONCE
Status: COMPLETED | OUTPATIENT
Start: 2018-08-30 | End: 2018-08-30

## 2018-08-30 RX ORDER — PREDNISONE 10 MG/1
TABLET ORAL
Qty: 21 TABLET | Refills: 0 | Status: SHIPPED | OUTPATIENT
Start: 2018-08-30 | End: 2018-12-06

## 2018-08-30 RX ORDER — CETIRIZINE HYDROCHLORIDE 10 MG/1
10 TABLET ORAL DAILY
Qty: 30 TABLET | Refills: 5 | Status: SHIPPED | OUTPATIENT
Start: 2018-08-30 | End: 2019-02-20 | Stop reason: SDUPTHER

## 2018-08-30 RX ORDER — RANITIDINE 150 MG/1
150 TABLET ORAL 2 TIMES DAILY
Qty: 60 TABLET | Refills: 1 | Status: SHIPPED | OUTPATIENT
Start: 2018-08-30 | End: 2018-10-29

## 2018-08-30 RX ADMIN — METHYLPREDNISOLONE ACETATE 80 MG: 40 INJECTION, SUSPENSION INTRA-ARTICULAR; INTRALESIONAL; INTRAMUSCULAR; SOFT TISSUE at 09:40

## 2018-08-30 NOTE — PROGRESS NOTES
Booker Miner is a 63 y.o. male.     Chief Complaint   Patient presents with   • Allergies       History of Present Illness   This gentleman has a several month history of generalized urticaria and occasional swelling of his lip.  He had this episode 11 years ago.  His swelling is not related to foods as far she can tell it usually wakes up with them in the morning.  They can be over many parts of his body particularly from the waist and below.  He denies any medication allergies.  He does not have any trouble breathing or swallowing.  Medications please see his list  The following portions of the patient's history were reviewed and updated as appropriate: allergies, current medications, past family history, past medical history, past social history, past surgical history and problem list.    Review of Systems   Constitutional: Negative.  Negative for activity change, appetite change, chills, diaphoresis, fatigue, fever and unexpected weight change.   HENT: Negative for congestion, ear discharge, ear pain, facial swelling, hearing loss, mouth sores, nosebleeds, postnasal drip, rhinorrhea, sinus pressure, sneezing, tinnitus, trouble swallowing and voice change.    Eyes: Negative for pain, discharge, redness and itching.   Respiratory: Negative for cough, shortness of breath and wheezing.    Cardiovascular: Negative for chest pain.   Gastrointestinal: Negative for abdominal pain, blood in stool, diarrhea and nausea.   Endocrine: Negative for cold intolerance, heat intolerance, polydipsia, polyphagia and polyuria.   Genitourinary: Negative for dysuria and urgency.   Musculoskeletal: Negative for arthralgias, gait problem, myalgias, neck pain and neck stiffness.   Skin: Negative for color change, pallor and rash ( Occasional lip swelling).   Allergic/Immunologic: Negative for environmental allergies, food allergies and immunocompromised state.   Neurological: Negative.  Negative for dizziness, tremors,  "syncope, facial asymmetry, weakness, light-headedness, numbness and headaches.   Hematological: Negative for adenopathy.   Psychiatric/Behavioral: Negative.  Negative for agitation, behavioral problems, confusion, decreased concentration, dysphoric mood and hallucinations. The patient is not nervous/anxious and is not hyperactive.        /74   Pulse 70   Ht 170.2 cm (67\")   Wt 63 kg (139 lb)   SpO2 96%   BMI 21.77 kg/m²   Physical Exam   Constitutional: He is oriented to person, place, and time. He appears well-developed and well-nourished. He appears distressed (Minimal swelling of his upper lip and generalized urticaria otherwise no distress).   HENT:   Head: Normocephalic and atraumatic.   Mouth/Throat: No oropharyngeal exudate.   Eyes: Pupils are equal, round, and reactive to light. EOM are normal. Right eye exhibits no discharge. Left eye exhibits no discharge. No scleral icterus.   Neck: Normal range of motion. Neck supple. No JVD present. No tracheal deviation present. No thyromegaly present.   Cardiovascular: Exam reveals no gallop and no friction rub.    No murmur heard.  Pulmonary/Chest: Effort normal and breath sounds normal. No stridor. No respiratory distress. He has no wheezes. He has no rales. He exhibits no tenderness.   Abdominal: Soft. Bowel sounds are normal. He exhibits no distension and no mass. There is no tenderness. There is no guarding.   Musculoskeletal: Normal range of motion. He exhibits no edema, tenderness or deformity.   Lymphadenopathy:     He has no cervical adenopathy.   Neurological: He is alert and oriented to person, place, and time. No cranial nerve deficit. Coordination normal.   Skin: No rash noted. No erythema.   Psychiatric: He has a normal mood and affect. His behavior is normal.   Nursing note and vitals reviewed.        Assessment/Plan   Lui was seen today for allergies.    Diagnoses and all orders for this visit:    Urticaria  -     methylPREDNISolone " acetate (DEPO-medrol) injection 80 mg; Inject 2 mL into the appropriate muscle as directed by prescriber 1 (One) Time.  -     CINDY  -     Rheumatoid Factor  -     Thyroid Peroxidase Antibody  -     C1 Esterase Inhibitor, Serum    Other orders  -     predniSONE (DELTASONE) 10 MG tablet; 2 tablets by mouth daily for 1 week, then 1 tablet daily for 1 week  -     cetirizine (zyrTEC) 10 MG tablet; Take 1 tablet by mouth Daily.  -     raNITIdine (ZANTAC) 150 MG tablet; Take 1 tablet by mouth 2 (Two) Times a Day for 60 days.      Assessment chronic recurring urticaria    Plan stop losartan, allergy blood work, Depo-Medrol, Zyrtec Zantac, return in 2 weeks        This document has been produced with the assistance of Dragon dictation  This document has been electronically signed by Flip Guzman MD on August 30, 2018 9:23 AM

## 2018-09-02 LAB — RHEUMATOID FACT SERPL-ACNC: NEGATIVE [IU]/ML

## 2018-09-04 LAB — C1INH SERPL-MCNC: 27 MG/DL (ref 21–39)

## 2018-09-13 ENCOUNTER — OFFICE VISIT (OUTPATIENT)
Dept: PULMONOLOGY | Facility: CLINIC | Age: 63
End: 2018-09-13

## 2018-09-13 VITALS
HEIGHT: 67 IN | BODY MASS INDEX: 21.94 KG/M2 | OXYGEN SATURATION: 98 % | DIASTOLIC BLOOD PRESSURE: 86 MMHG | SYSTOLIC BLOOD PRESSURE: 150 MMHG | WEIGHT: 139.8 LBS | HEART RATE: 63 BPM

## 2018-09-13 DIAGNOSIS — L50.9 URTICARIA: Primary | ICD-10-CM

## 2018-09-13 PROCEDURE — 99212 OFFICE O/P EST SF 10 MIN: CPT | Performed by: INTERNAL MEDICINE

## 2018-09-13 NOTE — PROGRESS NOTES
"This gentleman is had recurring urticaria.  Since starting Zyrtec and Zantac he's not had a recurrence.  No skin rash and difficulty breathing or swelling of lips or tongue    ROS    Constitutional-no night sweats weight loss headaches  GI no abdominal pain nausea or diarrhea  Neuro no seizure or neurologic deficits  Musculoskeletal no deformity or joint pain   no dysuria or hematuria  Skin no rash or other lesions  All other systems reviewed and were negative except for the above.      Physical Exam  /86 (BP Location: Left arm, Patient Position: Sitting, Cuff Size: Adult)   Pulse 63   Ht 170.2 cm (67\")   Wt 63.4 kg (139 lb 12.8 oz)   SpO2 98%   BMI 21.90 kg/m²   Vital signs as above  Pupils equally round and reactive to light and accommodation, neck no JVD or adenopathy.  Cardiovascular regular rhythm and rate no murmur or gallop.  Abdomen soft no organomegaly tenderness.  Extremities no clubbing cyanosis or edema.  No cervical adenopathy.  No skin rash.  Neurologic good strength bilaterally without deficits  Lungs are clear no distress, skin no rash    Urticaria workup was negative    Impression recurring urticaria with negative workup    Recommendations continue Zyrtec taken Zantac for several months, return if he has a recurrence        This document has been produced with the assistance of Dragon dictation  This document has been electronically signed by Flip Guzman MD on September 13, 2018 10:40 AM      "

## 2018-11-01 ENCOUNTER — OFFICE VISIT (OUTPATIENT)
Dept: PULMONOLOGY | Facility: CLINIC | Age: 63
End: 2018-11-01

## 2018-11-01 VITALS
BODY MASS INDEX: 23.34 KG/M2 | OXYGEN SATURATION: 98 % | SYSTOLIC BLOOD PRESSURE: 128 MMHG | WEIGHT: 148.7 LBS | HEIGHT: 67 IN | HEART RATE: 82 BPM | DIASTOLIC BLOOD PRESSURE: 82 MMHG

## 2018-11-01 DIAGNOSIS — L50.9 URTICARIA: Primary | ICD-10-CM

## 2018-11-01 PROCEDURE — 96372 THER/PROPH/DIAG INJ SC/IM: CPT | Performed by: INTERNAL MEDICINE

## 2018-11-01 PROCEDURE — 99213 OFFICE O/P EST LOW 20 MIN: CPT | Performed by: INTERNAL MEDICINE

## 2018-11-01 RX ORDER — METHYLPREDNISOLONE ACETATE 40 MG/ML
80 INJECTION, SUSPENSION INTRA-ARTICULAR; INTRALESIONAL; INTRAMUSCULAR; SOFT TISSUE ONCE
Status: COMPLETED | OUTPATIENT
Start: 2018-11-01 | End: 2018-11-01

## 2018-11-01 RX ORDER — RANITIDINE 150 MG/1
150 TABLET ORAL 2 TIMES DAILY
Qty: 60 TABLET | Refills: 1 | Status: SHIPPED | OUTPATIENT
Start: 2018-11-01 | End: 2018-12-06 | Stop reason: SDUPTHER

## 2018-11-01 RX ADMIN — METHYLPREDNISOLONE ACETATE 80 MG: 40 INJECTION, SUSPENSION INTRA-ARTICULAR; INTRALESIONAL; INTRAMUSCULAR; SOFT TISSUE at 13:41

## 2018-11-01 NOTE — PROGRESS NOTES
"This gentleman has recurring urticaria.  Over the last couple weeks he has had return of urticaria on his arms and trunk.  He's had no trouble breathing or swallowing.  He usually wakes up with urticaria.    ROS    Constitutional-no night sweats weight loss headaches  GI no abdominal pain nausea or diarrhea  Neuro no seizure or neurologic deficits  Musculoskeletal no deformity or joint pain   no dysuria or hematuria  Skin no rash or other lesions  All other systems reviewed and were negative except for the above.      Physical Exam  /82 (BP Location: Left arm, Patient Position: Sitting, Cuff Size: Adult)   Pulse 82   Ht 170.2 cm (67\")   Wt 67.4 kg (148 lb 11.2 oz)   SpO2 98%   BMI 23.29 kg/m²   Vital signs as above  Pupils equally round and reactive to light and accommodation, neck no JVD or adenopathy.  Cardiovascular regular rhythm and rate no murmur or gallop.  Abdomen soft no organomegaly tenderness.  Extremities no clubbing cyanosis or edema.  No cervical adenopathy.  No skin rash.  Neurologic good strength bilaterally without deficits  Lungs are clear skin reveals a few urticarial lesions    Impression recurring urticaria    Plan hold losartan, Zyrtec Zantac Depo-Medrol, return in 6 months        This document has been produced with the assistance of Dragon dictation  This document has been electronically signed by Flip Guzman MD on November 1, 2018 1:41 PM      "

## 2018-11-26 ENCOUNTER — LAB (OUTPATIENT)
Dept: LAB | Facility: OTHER | Age: 63
End: 2018-11-26

## 2018-11-26 DIAGNOSIS — I10 ESSENTIAL HYPERTENSION: ICD-10-CM

## 2018-11-26 DIAGNOSIS — E11.9 TYPE 2 DIABETES MELLITUS WITHOUT COMPLICATION, WITHOUT LONG-TERM CURRENT USE OF INSULIN (HCC): Chronic | ICD-10-CM

## 2018-11-26 DIAGNOSIS — E78.2 MIXED HYPERLIPIDEMIA: Chronic | ICD-10-CM

## 2018-11-26 LAB
ALBUMIN SERPL-MCNC: 4.7 G/DL (ref 3.5–5)
ALBUMIN/GLOB SERPL: 1.8 G/DL (ref 1.1–1.8)
ALP SERPL-CCNC: 94 U/L (ref 38–126)
ALT SERPL W P-5'-P-CCNC: 19 U/L
ANION GAP SERPL CALCULATED.3IONS-SCNC: 6 MMOL/L (ref 5–15)
ARTICHOKE IGE QN: 93 MG/DL (ref 1–129)
AST SERPL-CCNC: 18 U/L (ref 17–59)
BILIRUB SERPL-MCNC: 0.7 MG/DL (ref 0.2–1.3)
BILIRUB UR QL STRIP: NEGATIVE
BUN BLD-MCNC: 20 MG/DL (ref 9–20)
BUN/CREAT SERPL: 22.2 (ref 7–25)
CALCIUM SPEC-SCNC: 9.3 MG/DL (ref 8.4–10.2)
CHLORIDE SERPL-SCNC: 107 MMOL/L (ref 98–107)
CLARITY UR: CLEAR
CO2 SERPL-SCNC: 28 MMOL/L (ref 22–30)
COLOR UR: YELLOW
CREAT BLD-MCNC: 0.9 MG/DL (ref 0.66–1.25)
DEPRECATED RDW RBC AUTO: 41.4 FL (ref 35.1–43.9)
ERYTHROCYTE [DISTWIDTH] IN BLOOD BY AUTOMATED COUNT: 13 % (ref 11.5–14.5)
GFR SERPL CREATININE-BSD FRML MDRD: 85 ML/MIN/1.73 (ref 49–113)
GLOBULIN UR ELPH-MCNC: 2.6 GM/DL (ref 2.3–3.5)
GLUCOSE BLD-MCNC: 143 MG/DL (ref 74–99)
GLUCOSE UR STRIP-MCNC: NEGATIVE MG/DL
HBA1C MFR BLD: 7.6 % (ref 4–5.6)
HCT VFR BLD AUTO: 49.9 % (ref 39–49)
HGB BLD-MCNC: 17 G/DL (ref 13.7–17.3)
HGB UR QL STRIP.AUTO: NEGATIVE
KETONES UR QL STRIP: ABNORMAL
LEUKOCYTE ESTERASE UR QL STRIP.AUTO: NEGATIVE
LYMPHOCYTES # BLD MANUAL: 1.89 10*3/MM3 (ref 0.6–4.2)
LYMPHOCYTES NFR BLD MANUAL: 25 % (ref 10–50)
LYMPHOCYTES NFR BLD MANUAL: 9 % (ref 0–12)
MCH RBC QN AUTO: 29.8 PG (ref 26.5–34)
MCHC RBC AUTO-ENTMCNC: 34.1 G/DL (ref 31.5–36.3)
MCV RBC AUTO: 87.5 FL (ref 80–98)
MONOCYTES # BLD AUTO: 0.68 10*3/MM3 (ref 0–0.9)
NEUTROPHILS # BLD AUTO: 4.98 10*3/MM3 (ref 2–8.6)
NEUTROPHILS NFR BLD MANUAL: 66 % (ref 37–80)
NITRITE UR QL STRIP: NEGATIVE
PH UR STRIP.AUTO: 6 [PH] (ref 5.5–8)
PLATELET # BLD AUTO: 182 10*3/MM3 (ref 150–450)
PMV BLD AUTO: 9.9 FL (ref 8–12)
POTASSIUM BLD-SCNC: 4.1 MMOL/L (ref 3.4–5)
PROT SERPL-MCNC: 7.3 G/DL (ref 6.3–8.2)
PROT UR QL STRIP: NEGATIVE
RBC # BLD AUTO: 5.7 10*6/MM3 (ref 4.37–5.74)
RBC MORPH BLD: NORMAL
SMALL PLATELETS BLD QL SMEAR: ADEQUATE
SODIUM BLD-SCNC: 141 MMOL/L (ref 137–145)
SP GR UR STRIP: >=1.03 (ref 1–1.03)
T4 FREE SERPL-MCNC: 1.03 NG/DL (ref 0.78–2.19)
TSH SERPL DL<=0.05 MIU/L-ACNC: 3.15 MIU/ML (ref 0.46–4.68)
UROBILINOGEN UR QL STRIP: ABNORMAL
WBC MORPH BLD: NORMAL
WBC NRBC COR # BLD: 7.55 10*3/MM3 (ref 3.2–9.8)

## 2018-11-26 PROCEDURE — 80053 COMPREHEN METABOLIC PANEL: CPT | Performed by: INTERNAL MEDICINE

## 2018-11-26 PROCEDURE — 81003 URINALYSIS AUTO W/O SCOPE: CPT | Performed by: INTERNAL MEDICINE

## 2018-11-26 PROCEDURE — 84443 ASSAY THYROID STIM HORMONE: CPT | Performed by: INTERNAL MEDICINE

## 2018-11-26 PROCEDURE — 83036 HEMOGLOBIN GLYCOSYLATED A1C: CPT | Performed by: INTERNAL MEDICINE

## 2018-11-26 PROCEDURE — 83721 ASSAY OF BLOOD LIPOPROTEIN: CPT | Performed by: INTERNAL MEDICINE

## 2018-11-26 PROCEDURE — 84439 ASSAY OF FREE THYROXINE: CPT | Performed by: INTERNAL MEDICINE

## 2018-11-26 PROCEDURE — 85025 COMPLETE CBC W/AUTO DIFF WBC: CPT | Performed by: INTERNAL MEDICINE

## 2018-11-27 DIAGNOSIS — E11.9 TYPE 2 DIABETES MELLITUS WITHOUT COMPLICATION, WITHOUT LONG-TERM CURRENT USE OF INSULIN (HCC): Chronic | ICD-10-CM

## 2018-11-27 RX ORDER — GLIMEPIRIDE 1 MG/1
0.5 TABLET ORAL
Qty: 45 TABLET | Refills: 0 | Status: SHIPPED | OUTPATIENT
Start: 2018-11-27 | End: 2019-06-06 | Stop reason: SDUPTHER

## 2018-12-06 ENCOUNTER — OFFICE VISIT (OUTPATIENT)
Dept: FAMILY MEDICINE CLINIC | Facility: CLINIC | Age: 63
End: 2018-12-06

## 2018-12-06 VITALS
BODY MASS INDEX: 23.23 KG/M2 | SYSTOLIC BLOOD PRESSURE: 126 MMHG | HEIGHT: 67 IN | HEART RATE: 72 BPM | WEIGHT: 148 LBS | TEMPERATURE: 97.2 F | DIASTOLIC BLOOD PRESSURE: 70 MMHG

## 2018-12-06 DIAGNOSIS — E78.2 MIXED HYPERLIPIDEMIA: Chronic | ICD-10-CM

## 2018-12-06 DIAGNOSIS — E11.9 TYPE 2 DIABETES MELLITUS WITHOUT COMPLICATION, WITHOUT LONG-TERM CURRENT USE OF INSULIN (HCC): Primary | Chronic | ICD-10-CM

## 2018-12-06 DIAGNOSIS — I10 ESSENTIAL HYPERTENSION: Chronic | ICD-10-CM

## 2018-12-06 DIAGNOSIS — Z12.5 SCREENING FOR PROSTATE CANCER: ICD-10-CM

## 2018-12-06 PROCEDURE — 99214 OFFICE O/P EST MOD 30 MIN: CPT | Performed by: INTERNAL MEDICINE

## 2018-12-06 RX ORDER — RANITIDINE 150 MG/1
150 TABLET ORAL 2 TIMES DAILY
Qty: 60 TABLET | Refills: 1 | Status: SHIPPED | OUTPATIENT
Start: 2018-12-06 | End: 2019-02-04

## 2018-12-17 DIAGNOSIS — I10 ESSENTIAL HYPERTENSION: Chronic | ICD-10-CM

## 2018-12-17 RX ORDER — LOSARTAN POTASSIUM 50 MG/1
50 TABLET ORAL DAILY
Qty: 30 TABLET | Refills: 5 | Status: SHIPPED | OUTPATIENT
Start: 2018-12-17 | End: 2019-06-06 | Stop reason: SDUPTHER

## 2019-01-15 DIAGNOSIS — E11.9 TYPE 2 DIABETES MELLITUS WITHOUT COMPLICATION, WITHOUT LONG-TERM CURRENT USE OF INSULIN (HCC): Chronic | ICD-10-CM

## 2019-01-15 RX ORDER — LINAGLIPTIN 5 MG/1
TABLET, FILM COATED ORAL
Qty: 30 TABLET | Refills: 5 | Status: SHIPPED | OUTPATIENT
Start: 2019-01-15 | End: 2019-06-06 | Stop reason: SDUPTHER

## 2019-02-20 RX ORDER — CETIRIZINE HYDROCHLORIDE 10 MG/1
10 TABLET ORAL DAILY
Qty: 30 TABLET | Refills: 2 | Status: SHIPPED | OUTPATIENT
Start: 2019-02-20 | End: 2019-04-22

## 2019-05-01 ENCOUNTER — OFFICE VISIT (OUTPATIENT)
Dept: PULMONOLOGY | Facility: CLINIC | Age: 64
End: 2019-05-01

## 2019-05-01 VITALS
SYSTOLIC BLOOD PRESSURE: 144 MMHG | BODY MASS INDEX: 22.55 KG/M2 | HEIGHT: 67 IN | WEIGHT: 143.7 LBS | HEART RATE: 64 BPM | DIASTOLIC BLOOD PRESSURE: 78 MMHG | OXYGEN SATURATION: 98 %

## 2019-05-01 DIAGNOSIS — L50.8 CHRONIC URTICARIA: Primary | ICD-10-CM

## 2019-05-01 PROCEDURE — 99212 OFFICE O/P EST SF 10 MIN: CPT | Performed by: INTERNAL MEDICINE

## 2019-05-01 RX ORDER — CETIRIZINE HYDROCHLORIDE 10 MG/1
10 TABLET ORAL DAILY
Qty: 30 TABLET | Refills: 2 | OUTPATIENT
Start: 2019-05-01 | End: 2020-02-18

## 2019-05-01 RX ORDER — RANITIDINE 150 MG/1
150 TABLET ORAL 2 TIMES DAILY
Qty: 60 TABLET | Refills: 1 | Status: SHIPPED | OUTPATIENT
Start: 2019-05-01 | End: 2019-06-06

## 2019-05-01 RX ORDER — PREDNISONE 10 MG/1
TABLET ORAL
Qty: 21 TABLET | Refills: 0 | Status: SHIPPED | OUTPATIENT
Start: 2019-05-01 | End: 2019-06-06

## 2019-05-01 NOTE — PROGRESS NOTES
"This gentleman has recurring urticaria.  He did well for 6 months but had the breakouts over the last weekend.  He received a cortisone injection and his urticaria resolved he did not have trouble breathing or swallowing    ROS    Constitutional-no night sweats weight loss headaches  GI no abdominal pain nausea or diarrhea  Neuro no seizure or neurologic deficits  Musculoskeletal no deformity or joint pain   no dysuria or hematuria  Skin no rash or other lesions  All other systems reviewed and were negative except for the above.      Physical Exam  /78   Pulse 64   Ht 170.2 cm (67\")   Wt 65.2 kg (143 lb 11.2 oz)   SpO2 98%   BMI 22.51 kg/m²   Vital signs as above  Pupils equally round and reactive to light and accommodation, neck no JVD or adenopathy.  Cardiovascular regular rhythm and rate no murmur or gallop.  Abdomen soft no organomegaly tenderness.  Extremities no clubbing cyanosis or edema.  No cervical adenopathy.  No skin rash.  Neurologic good strength bilaterally without deficits  Lungs reveal clear skin clear    Impression recurring urticaria    Plan Zyrtec Zantac prednisone if needed, return in 6 months        This document has been produced with the assistance of Dragon dictation  This document has been electronically signed by Flip Guzman MD on May 1, 2019 9:11 AM      "

## 2019-05-28 ENCOUNTER — LAB (OUTPATIENT)
Dept: LAB | Facility: OTHER | Age: 64
End: 2019-05-28

## 2019-05-28 DIAGNOSIS — I10 ESSENTIAL HYPERTENSION: Chronic | ICD-10-CM

## 2019-05-28 DIAGNOSIS — E11.9 TYPE 2 DIABETES MELLITUS WITHOUT COMPLICATION, WITHOUT LONG-TERM CURRENT USE OF INSULIN (HCC): Chronic | ICD-10-CM

## 2019-05-30 ENCOUNTER — LAB (OUTPATIENT)
Dept: LAB | Facility: OTHER | Age: 64
End: 2019-05-30

## 2019-05-30 DIAGNOSIS — I10 ESSENTIAL HYPERTENSION: ICD-10-CM

## 2019-05-30 DIAGNOSIS — E11.9 TYPE 2 DIABETES MELLITUS WITHOUT COMPLICATION, WITHOUT LONG-TERM CURRENT USE OF INSULIN (HCC): Chronic | ICD-10-CM

## 2019-05-30 DIAGNOSIS — E78.2 MIXED HYPERLIPIDEMIA: Chronic | ICD-10-CM

## 2019-05-30 DIAGNOSIS — Z12.5 SCREENING FOR PROSTATE CANCER: ICD-10-CM

## 2019-05-30 LAB
ALBUMIN SERPL-MCNC: 3.9 G/DL (ref 3.5–5)
ALBUMIN UR-MCNC: <1.2 MG/L
ALBUMIN/GLOB SERPL: 1.4 G/DL (ref 1.1–1.8)
ALP SERPL-CCNC: 64 U/L (ref 38–126)
ALT SERPL W P-5'-P-CCNC: 19 U/L
ANION GAP SERPL CALCULATED.3IONS-SCNC: 8 MMOL/L (ref 5–15)
AST SERPL-CCNC: 21 U/L (ref 17–59)
BILIRUB SERPL-MCNC: 0.5 MG/DL (ref 0.2–1.3)
BILIRUB UR QL STRIP: NEGATIVE
BUN BLD-MCNC: 21 MG/DL (ref 7–23)
BUN/CREAT SERPL: 22.8 (ref 7–25)
CALCIUM SPEC-SCNC: 8.7 MG/DL (ref 8.4–10.2)
CHLORIDE SERPL-SCNC: 107 MMOL/L (ref 101–112)
CHOLEST SERPL-MCNC: 144 MG/DL (ref 150–200)
CLARITY UR: CLEAR
CO2 SERPL-SCNC: 26 MMOL/L (ref 22–30)
COLOR UR: YELLOW
CREAT BLD-MCNC: 0.92 MG/DL (ref 0.7–1.3)
CREAT UR-MCNC: 115.2 MG/DL
DEPRECATED RDW RBC AUTO: 40.6 FL (ref 37–54)
EOSINOPHIL # BLD MANUAL: 0.18 10*3/MM3 (ref 0–0.4)
EOSINOPHIL NFR BLD MANUAL: 4 % (ref 0.3–6.2)
ERYTHROCYTE [DISTWIDTH] IN BLOOD BY AUTOMATED COUNT: 12.7 % (ref 12.3–15.4)
GFR SERPL CREATININE-BSD FRML MDRD: 83 ML/MIN/1.73 (ref 49–113)
GLOBULIN UR ELPH-MCNC: 2.8 GM/DL (ref 2.3–3.5)
GLUCOSE BLD-MCNC: 114 MG/DL (ref 70–99)
GLUCOSE UR STRIP-MCNC: ABNORMAL MG/DL
HBA1C MFR BLD: 7.12 % (ref 4.8–5.6)
HCT VFR BLD AUTO: 45.8 % (ref 37.5–51)
HDLC SERPL-MCNC: 55 MG/DL (ref 40–59)
HGB BLD-MCNC: 16.1 G/DL (ref 13–17.7)
HGB UR QL STRIP.AUTO: NEGATIVE
KETONES UR QL STRIP: NEGATIVE
LDLC SERPL CALC-MCNC: 77 MG/DL
LDLC/HDLC SERPL: 1.4 {RATIO} (ref 0–3.55)
LEUKOCYTE ESTERASE UR QL STRIP.AUTO: NEGATIVE
LYMPHOCYTES # BLD MANUAL: 1.55 10*3/MM3 (ref 0.7–3.1)
LYMPHOCYTES NFR BLD MANUAL: 12 % (ref 5–12)
LYMPHOCYTES NFR BLD MANUAL: 34 % (ref 19.6–45.3)
MCH RBC QN AUTO: 30.9 PG (ref 26.6–33)
MCHC RBC AUTO-ENTMCNC: 35.2 G/DL (ref 31.5–35.7)
MCV RBC AUTO: 87.9 FL (ref 79–97)
MICROALBUMIN/CREAT UR: NORMAL MG/G
MONOCYTES # BLD AUTO: 0.55 10*3/MM3 (ref 0.1–0.9)
NEUTROPHILS # BLD AUTO: 2.28 10*3/MM3 (ref 1.7–7)
NEUTROPHILS NFR BLD MANUAL: 50 % (ref 42.7–76)
NITRITE UR QL STRIP: NEGATIVE
PH UR STRIP.AUTO: 6 [PH] (ref 5.5–8)
PLAT MORPH BLD: NORMAL
PLATELET # BLD AUTO: 154 10*3/MM3 (ref 140–450)
PMV BLD AUTO: 10.3 FL (ref 6–12)
POTASSIUM BLD-SCNC: 4.1 MMOL/L (ref 3.4–5)
PROT SERPL-MCNC: 6.7 G/DL (ref 6.3–8.6)
PROT UR QL STRIP: NEGATIVE
PSA SERPL-MCNC: 2.23 NG/ML (ref 0–4)
RBC # BLD AUTO: 5.21 10*6/MM3 (ref 4.14–5.8)
RBC MORPH BLD: NORMAL
SODIUM BLD-SCNC: 141 MMOL/L (ref 137–145)
SP GR UR STRIP: 1.02 (ref 1–1.03)
T4 FREE SERPL-MCNC: 1.19 NG/DL (ref 0.93–1.7)
TRIGL SERPL-MCNC: 60 MG/DL
TSH SERPL DL<=0.05 MIU/L-ACNC: 3.5 MIU/ML (ref 0.27–4.2)
UROBILINOGEN UR QL STRIP: ABNORMAL
VLDLC SERPL-MCNC: 12 MG/DL
WBC MORPH BLD: NORMAL
WBC NRBC COR # BLD: 4.56 10*3/MM3 (ref 3.4–10.8)

## 2019-05-30 PROCEDURE — 85025 COMPLETE CBC W/AUTO DIFF WBC: CPT | Performed by: INTERNAL MEDICINE

## 2019-05-30 PROCEDURE — 83036 HEMOGLOBIN GLYCOSYLATED A1C: CPT | Performed by: INTERNAL MEDICINE

## 2019-05-30 PROCEDURE — 80061 LIPID PANEL: CPT | Performed by: INTERNAL MEDICINE

## 2019-05-30 PROCEDURE — G0103 PSA SCREENING: HCPCS | Performed by: INTERNAL MEDICINE

## 2019-05-30 PROCEDURE — 84443 ASSAY THYROID STIM HORMONE: CPT | Performed by: INTERNAL MEDICINE

## 2019-05-30 PROCEDURE — 82043 UR ALBUMIN QUANTITATIVE: CPT | Performed by: INTERNAL MEDICINE

## 2019-05-30 PROCEDURE — 82570 ASSAY OF URINE CREATININE: CPT | Performed by: INTERNAL MEDICINE

## 2019-05-30 PROCEDURE — 81003 URINALYSIS AUTO W/O SCOPE: CPT | Performed by: INTERNAL MEDICINE

## 2019-05-30 PROCEDURE — 80053 COMPREHEN METABOLIC PANEL: CPT | Performed by: INTERNAL MEDICINE

## 2019-05-30 PROCEDURE — 36415 COLL VENOUS BLD VENIPUNCTURE: CPT | Performed by: INTERNAL MEDICINE

## 2019-05-30 PROCEDURE — 84439 ASSAY OF FREE THYROXINE: CPT | Performed by: INTERNAL MEDICINE

## 2019-06-06 ENCOUNTER — OFFICE VISIT (OUTPATIENT)
Dept: FAMILY MEDICINE CLINIC | Facility: CLINIC | Age: 64
End: 2019-06-06

## 2019-06-06 VITALS
WEIGHT: 143 LBS | BODY MASS INDEX: 22.44 KG/M2 | TEMPERATURE: 97.7 F | HEART RATE: 63 BPM | SYSTOLIC BLOOD PRESSURE: 114 MMHG | DIASTOLIC BLOOD PRESSURE: 64 MMHG | HEIGHT: 67 IN

## 2019-06-06 DIAGNOSIS — I10 ESSENTIAL HYPERTENSION: Chronic | ICD-10-CM

## 2019-06-06 DIAGNOSIS — Z00.00 INITIAL MEDICARE ANNUAL WELLNESS VISIT: Primary | ICD-10-CM

## 2019-06-06 DIAGNOSIS — D23.30 CYST, DERMOID, FACE: ICD-10-CM

## 2019-06-06 DIAGNOSIS — E78.2 MIXED HYPERLIPIDEMIA: Chronic | ICD-10-CM

## 2019-06-06 DIAGNOSIS — Z23 PNEUMOCOCCAL VACCINATION GIVEN: ICD-10-CM

## 2019-06-06 DIAGNOSIS — E11.65 TYPE 2 DIABETES MELLITUS WITH HYPERGLYCEMIA, WITHOUT LONG-TERM CURRENT USE OF INSULIN (HCC): Chronic | ICD-10-CM

## 2019-06-06 PROBLEM — L50.8 CHRONIC URTICARIA: Chronic | Status: ACTIVE | Noted: 2019-06-06

## 2019-06-06 PROCEDURE — 99214 OFFICE O/P EST MOD 30 MIN: CPT | Performed by: INTERNAL MEDICINE

## 2019-06-06 PROCEDURE — G0009 ADMIN PNEUMOCOCCAL VACCINE: HCPCS | Performed by: INTERNAL MEDICINE

## 2019-06-06 PROCEDURE — G0438 PPPS, INITIAL VISIT: HCPCS | Performed by: INTERNAL MEDICINE

## 2019-06-06 PROCEDURE — 90670 PCV13 VACCINE IM: CPT | Performed by: INTERNAL MEDICINE

## 2019-06-06 RX ORDER — GLIMEPIRIDE 1 MG/1
0.5 TABLET ORAL
Qty: 45 TABLET | Refills: 5 | Status: SHIPPED | OUTPATIENT
Start: 2019-06-06 | End: 2020-06-04 | Stop reason: SDUPTHER

## 2019-06-06 RX ORDER — LOSARTAN POTASSIUM 50 MG/1
50 TABLET ORAL DAILY
Qty: 30 TABLET | Refills: 5 | Status: SHIPPED | OUTPATIENT
Start: 2019-06-06 | End: 2019-10-08 | Stop reason: SDUPTHER

## 2019-06-06 RX ORDER — ATORVASTATIN CALCIUM 20 MG/1
10 TABLET, FILM COATED ORAL NIGHTLY
Qty: 45 TABLET | Refills: 5 | Status: SHIPPED | OUTPATIENT
Start: 2019-06-06 | End: 2020-06-04 | Stop reason: SDUPTHER

## 2019-06-06 NOTE — PROGRESS NOTES
Chief Complaint   Patient presents with   • Medicare Wellness-Initial Visit   • Hypertension     6 mo f/u with labs   • Diabetes     Subjective   Lui Miner is a 64 y.o. male who presents to the office for follow-up and review of labs. He has diabetes, and His blood sugar has been doing well.  It runs a little high at times, but this is mostly related to his diet.  He is currently taking Tradjenta and glimepiride.  He is tolerating these with no side effects. He has hypertension and his blood pressure has been well controlled.  He has hyperlipidemia and takes Lipitor daily.     He complains of a cystic lesion on the right side of his face, at the top of his jawline.  This has recently gotten larger.  He is requesting a referral to dermatology to have this removed.    The following portions of the patient's history were reviewed and updated as appropriate: allergies, current medications, past family history, past medical history, past social history, past surgical history and problem list.    Review of Systems   Constitutional: Negative for chills, fatigue and fever.   HENT: Negative for congestion, sneezing, sore throat and trouble swallowing.    Eyes: Negative for visual disturbance.   Respiratory: Negative for cough, chest tightness, shortness of breath and wheezing.    Cardiovascular: Negative for chest pain, palpitations and leg swelling.   Gastrointestinal: Negative for abdominal pain, constipation, diarrhea, nausea and vomiting.   Genitourinary: Negative for dysuria, frequency and urgency.   Musculoskeletal: Negative for neck pain.   Neurological: Negative for dizziness, weakness and headaches.   Psychiatric/Behavioral:        Patient denies any feelings of depression and has not felt down, hopeless or lost interest in any activities.   All other systems reviewed and are negative.      Objective   Vitals:    06/06/19 0801   BP: 114/64   BP Location: Left arm   Patient Position: Sitting   Cuff Size:  "Adult   Pulse: 63   Temp: 97.7 °F (36.5 °C)   TempSrc: Oral   Weight: 64.9 kg (143 lb)   Height: 170.2 cm (67\")   PainSc: 0-No pain     Physical Exam   Constitutional: He is oriented to person, place, and time. He appears well-developed and well-nourished. No distress.   HENT:   Head: Normocephalic and atraumatic.   Nose: Nose normal.   Mouth/Throat: Oropharynx is clear and moist. No oropharyngeal exudate.   Eyes: Conjunctivae and EOM are normal. Pupils are equal, round, and reactive to light. No scleral icterus.   Neck: Normal range of motion. Neck supple.   Cardiovascular: Normal rate, regular rhythm and normal heart sounds. Exam reveals no gallop and no friction rub.   No murmur heard.  Pulmonary/Chest: Effort normal and breath sounds normal. No respiratory distress. He has no wheezes. He has no rales.   Abdominal: Soft. Bowel sounds are normal. He exhibits no distension. There is no tenderness. There is no rebound and no guarding.   Musculoskeletal: Normal range of motion. He exhibits no edema.   Lymphadenopathy:     He has no cervical adenopathy.   Neurological: He is alert and oriented to person, place, and time. No cranial nerve deficit.   Skin: Skin is warm and dry. No rash noted.   A papular cystic appearing lesion on the right side of his face at the upper part of his jaw.   Psychiatric: He has a normal mood and affect. His behavior is normal. Judgment and thought content normal.   Nursing note and vitals reviewed.      Assessment/Plan   Lui was seen today for medicare wellness-initial visit, hypertension and diabetes.    Diagnoses and all orders for this visit:    Initial Medicare annual wellness visit    Type 2 diabetes mellitus with hyperglycemia, without long-term current use of insulin (CMS/[facility])  -     Hemoglobin A1c; Future  -     glimepiride (AMARYL) 1 MG tablet; Take 0.5 tablets by mouth Every Morning Before Breakfast.  -     linagliptin (TRADJENTA) 5 MG tablet tablet; Take 1 tablet by mouth " Daily.    Essential hypertension  -     CBC & Differential; Future  -     Comprehensive Metabolic Panel; Future  -     T4, Free; Future  -     TSH; Future  -     Urinalysis With Culture If Indicated - Urine, Clean Catch; Future  -     losartan (COZAAR) 50 MG tablet; Take 1 tablet by mouth Daily.    Mixed hyperlipidemia  -     LDL Cholesterol, Direct; Future  -     atorvastatin (LIPITOR) 20 MG tablet; Take 0.5 tablets by mouth Every Night.    Pneumococcal vaccination given  -     Pneumococcal Conjugate Vaccine 13-Valent All    Cyst, dermoid, face  -     Ambulatory Referral to Dermatology         Labs are reviewed with patient.  His glucose is 114, and his hemoglobin A1c is 7.12.  This has improved from 7.6 at the last visit.  Overall, his diabetes seems to be well controlled.  He will continue with the current dose of Tradjenta and glimepiride. I also discussed dietary measures to help with control of the diabetes.  He may monitor blood sugar one time daily.  His lipids are at goal and he will continue with Lipitor.  His blood pressure is well controlled and he will continue with losartan.  His PSA is 2.2, which is unchanged from last year's value.  He is reminded that he will be due for his annual diabetic eye exam in October.    I will refer him to dermatology for evaluation and removal of the cyst/lesion on his face.      PHQ-2/PHQ-9 Depression Screening 6/6/2019   Little interest or pleasure in doing things 0   Feeling down, depressed, or hopeless 0   Trouble falling or staying asleep, or sleeping too much -   Feeling tired or having little energy -   Poor appetite or overeating -   Feeling bad about yourself - or that you are a failure or have let yourself or your family down -   Trouble concentrating on things, such as reading the newspaper or watching television -   Moving or speaking so slowly that other people could have noticed. Or the opposite - being so fidgety or restless that you have been moving around  a lot more than usual -   Thoughts that you would be better off dead, or of hurting yourself in some way -   Total Score 0         Lab on 05/30/2019   Component Date Value Ref Range Status   • Glucose 05/30/2019 114* 70 - 99 mg/dL Final   • BUN 05/30/2019 21  7 - 23 mg/dL Final   • Creatinine 05/30/2019 0.92  0.70 - 1.30 mg/dL Final   • Sodium 05/30/2019 141  137 - 145 mmol/L Final   • Potassium 05/30/2019 4.1  3.4 - 5.0 mmol/L Final   • Chloride 05/30/2019 107  101 - 112 mmol/L Final   • CO2 05/30/2019 26.0  22.0 - 30.0 mmol/L Final   • Calcium 05/30/2019 8.7  8.4 - 10.2 mg/dL Final   • Total Protein 05/30/2019 6.7  6.3 - 8.6 g/dL Final   • Albumin 05/30/2019 3.90  3.50 - 5.00 g/dL Final   • ALT (SGPT) 05/30/2019 19  <=50 U/L Final   • AST (SGOT) 05/30/2019 21  17 - 59 U/L Final   • Alkaline Phosphatase 05/30/2019 64  38 - 126 U/L Final   • Total Bilirubin 05/30/2019 0.5  0.2 - 1.3 mg/dL Final   • eGFR Non African Amer 05/30/2019 83  49 - 113 mL/min/1.73 Final   • Globulin 05/30/2019 2.8  2.3 - 3.5 gm/dL Final   • A/G Ratio 05/30/2019 1.4  1.1 - 1.8 g/dL Final   • BUN/Creatinine Ratio 05/30/2019 22.8  7.0 - 25.0 Final   • Anion Gap 05/30/2019 8.0  5.0 - 15.0 mmol/L Final   • Hemoglobin A1C 05/30/2019 7.12* 4.80 - 5.60 % Final   • Total Cholesterol 05/30/2019 144* 150 - 200 mg/dL Final   • Triglycerides 05/30/2019 60  <=150 mg/dL Final   • HDL Cholesterol 05/30/2019 55  40 - 59 mg/dL Final   • LDL Cholesterol  05/30/2019 77  <=100 mg/dL Final   • VLDL Cholesterol 05/30/2019 12  mg/dL Final   • LDL/HDL Ratio 05/30/2019 1.40  0.00 - 3.55 Final   • PSA 05/30/2019 2.230  0.000 - 4.000 ng/mL Final   • Free T4 05/30/2019 1.19  0.93 - 1.70 ng/dL Final   • TSH 05/30/2019 3.500  0.270 - 4.200 mIU/mL Final   • WBC 05/30/2019 4.56  3.40 - 10.80 10*3/mm3 Final   • RBC 05/30/2019 5.21  4.14 - 5.80 10*6/mm3 Final   • Hemoglobin 05/30/2019 16.1  13.0 - 17.7 g/dL Final   • Hematocrit 05/30/2019 45.8  37.5 - 51.0 % Final   • MCV  05/30/2019 87.9  79.0 - 97.0 fL Final   • MCH 05/30/2019 30.9  26.6 - 33.0 pg Final   • MCHC 05/30/2019 35.2  31.5 - 35.7 g/dL Final   • RDW 05/30/2019 12.7  12.3 - 15.4 % Final   • RDW-SD 05/30/2019 40.6  37.0 - 54.0 fl Final   • MPV 05/30/2019 10.3  6.0 - 12.0 fL Final   • Platelets 05/30/2019 154  140 - 450 10*3/mm3 Final   • Neutrophil % 05/30/2019 50.0  42.7 - 76.0 % Final   • Lymphocyte % 05/30/2019 34.0  19.6 - 45.3 % Final   • Monocyte % 05/30/2019 12.0  5.0 - 12.0 % Final   • Eosinophil % 05/30/2019 4.0  0.3 - 6.2 % Final   • Neutrophils Absolute 05/30/2019 2.28  1.70 - 7.00 10*3/mm3 Final   • Lymphocytes Absolute 05/30/2019 1.55  0.70 - 3.10 10*3/mm3 Final   • Monocytes Absolute 05/30/2019 0.55  0.10 - 0.90 10*3/mm3 Final   • Eosinophils Absolute 05/30/2019 0.18  0.00 - 0.40 10*3/mm3 Final   • RBC Morphology 05/30/2019 Normal  Normal Final   • WBC Morphology 05/30/2019 Normal  Normal Final   • Platelet Morphology 05/30/2019 Normal  Normal Final   Lab on 05/28/2019   Component Date Value Ref Range Status   • Microalbumin/Creatinine Ratio 05/30/2019   mg/g Final    Unable to calculate   • Creatinine, Urine 05/30/2019 115.2  mg/dL Final   • Microalbumin, Urine 05/30/2019 <1.2  mg/L Final   • Color, UA 05/30/2019 Yellow  Yellow, Straw Final   • Appearance, UA 05/30/2019 Clear  Clear Final   • pH, UA 05/30/2019 6.0  5.5 - 8.0 Final   • Specific Gravity, UA 05/30/2019 1.025  1.005 - 1.030 Final   • Glucose, UA 05/30/2019 250 mg/dL (1+)* Negative Final   • Ketones, UA 05/30/2019 Negative  Negative Final   • Bilirubin, UA 05/30/2019 Negative  Negative Final   • Blood, UA 05/30/2019 Negative  Negative Final   • Protein, UA 05/30/2019 Negative  Negative Final   • Leuk Esterase, UA 05/30/2019 Negative  Negative Final   • Nitrite, UA 05/30/2019 Negative  Negative Final   • Urobilinogen, UA 05/30/2019 0.2 E.U./dL  0.2 - 1.0 E.U./dL Final   ]        .

## 2019-06-06 NOTE — PROGRESS NOTES
QUICK REFERENCE INFORMATION:  The ABCs of the Annual Wellness Visit    Initial Medicare Wellness Visit    HEALTH RISK ASSESSMENT    : 1955    Recent Hospitalizations:  No hospitalization(s) within the last year..  ccc      Current Medical Providers:  Patient Care Team:  Kaiser Hassan MD as PCP - General (Family Medicine)  Mannie Collins DPM as Consulting Physician (Podiatry)  Paresh Lloyd OD as Consulting Physician (Optometry)        Smoking Status:  Social History     Tobacco Use   Smoking Status Never Smoker   Smokeless Tobacco Never Used       Alcohol Consumption:  Social History     Substance and Sexual Activity   Alcohol Use No       Depression Screen:   PHQ-2/PHQ-9 Depression Screening 2019   Little interest or pleasure in doing things 0   Feeling down, depressed, or hopeless 0   Trouble falling or staying asleep, or sleeping too much -   Feeling tired or having little energy -   Poor appetite or overeating -   Feeling bad about yourself - or that you are a failure or have let yourself or your family down -   Trouble concentrating on things, such as reading the newspaper or watching television -   Moving or speaking so slowly that other people could have noticed. Or the opposite - being so fidgety or restless that you have been moving around a lot more than usual -   Thoughts that you would be better off dead, or of hurting yourself in some way -   Total Score 0       Health Habits and Functional and Cognitive Screening:  No flowsheet data found.        Does the patient have evidence of cognitive impairment? No    Asiprin use counseling: Does not take ASA      Recent Lab Results:       Lab Results   Component Value Date    HGBA1C 7.12 (H) 2019     Lab Results   Component Value Date    CHOL 144 (L) 2019    TRIG 60 2019    HDL 55 2019    VLDL 12 2019    LDLHDL 1.40 2019           Age-appropriate Screening Schedule:  Refer to the list below for future  screening recommendations based on patient's age, sex and/or medical conditions. Orders for these recommended tests are listed in the plan section. The patient has been provided with a written plan.    Health Maintenance   Topic Date Due   • ZOSTER VACCINE (1 of 2) 03/17/2004   • DIABETIC FOOT EXAM  11/28/2018   • PNEUMOCOCCAL VACCINES (65+ LOW/MEDIUM RISK) (1 of 2 - PCV13) 03/17/2019   • INFLUENZA VACCINE  08/01/2019   • DIABETIC EYE EXAM  10/18/2019   • HEMOGLOBIN A1C  11/30/2019   • PROSTATE CANCER SCREENING  05/30/2020   • LIPID PANEL  05/30/2020   • URINE MICROALBUMIN  05/30/2020   • TDAP/TD VACCINES (2 - Td) 11/06/2024   • COLONOSCOPY  01/18/2027        Subjective   History of Present Illness    Lui Miner is a 64 y.o. male who presents for an Annual Wellness Visit.    The following portions of the patient's history were reviewed and updated as appropriate: allergies, current medications, past family history, past medical history, past social history, past surgical history and problem list.    Outpatient Medications Prior to Visit   Medication Sig Dispense Refill   • Blood Glucose Monitoring Suppl (ACURA BLOOD GLUCOSE METER) W/DEVICE kit to test FSBS 1 time per day as directed.DX:DM/E11.9, #1 glucometer, #50 strips, #100 lancets     • cetirizine (zyrTEC) 10 MG tablet Take 1 tablet by mouth Daily. 30 tablet 2   • glucose blood test strip Use to check fsbs daily 50 each 11   • atorvastatin (LIPITOR) 20 MG tablet Take 0.5 tablets by mouth Every Night. 45 tablet 3   • glimepiride (AMARYL) 1 MG tablet Take 0.5 tablets by mouth Every Morning Before Breakfast. 45 tablet 0   • losartan (COZAAR) 50 MG tablet TAKE 1 TABLET BY MOUTH DAILY. 30 tablet 5   • TRADJENTA 5 MG tablet tablet TAKE 1 TABLET BY MOUTH DAILY. 30 tablet 5   • predniSONE (DELTASONE) 10 MG tablet 2 tablets by mouth daily for 1 week, then 1 tablet daily for 1 week 21 tablet 0   • raNITIdine (ZANTAC) 150 MG tablet Take 1 tablet by mouth 2 (Two)  "Times a Day for 60 days. 60 tablet 1     No facility-administered medications prior to visit.        Patient Active Problem List   Diagnosis   • Type 2 diabetes mellitus with hyperglycemia, without long-term current use of insulin (CMS/[facility])   • Mixed hyperlipidemia   • Essential hypertension   • Chronic urticaria       Advance Care Planning:  Patient does not have an advance directive - not interested in additional information    Identification of Risk Factors:  Risk factors include: Diabetes, Hypertension.    Review of Systems    Compared to one year ago, the patient feels his physical health is the same.  Compared to one year ago, the patient feels his mental health is the same.    Objective     Physical Exam    Vitals:    06/06/19 0801   BP: 114/64   BP Location: Left arm   Patient Position: Sitting   Cuff Size: Adult   Pulse: 63   Temp: 97.7 °F (36.5 °C)   TempSrc: Oral   Weight: 64.9 kg (143 lb)   Height: 170.2 cm (67\")   PainSc: 0-No pain       Patient's Body mass index is 22.4 kg/m². BMI is within normal parameters. No follow-up required..      Assessment/Plan   Patient Self-Management and Personalized Health Advice  The patient has been provided with information about: diet and exercise and preventive services including:   · Exercise counseling provided, Fall Risk assessment done, Nutrition counseling provided, Pneumococcal vaccine , Prostate cancer screening discussed.    Visit Diagnoses:    ICD-10-CM ICD-9-CM   1. Initial Medicare annual wellness visit Z00.00 V70.0   2. Type 2 diabetes mellitus with hyperglycemia, without long-term current use of insulin (CMS/[facility]) E11.65 250.00     790.29   3. Essential hypertension I10 401.9   4. Mixed hyperlipidemia E78.2 272.2   5. Pneumococcal vaccination given Z23 V06.6   6. Cyst, dermoid, face D23.30 216.3       Orders Placed This Encounter   Procedures   • Pneumococcal Conjugate Vaccine 13-Valent All   • Comprehensive Metabolic Panel     Standing Status:   Future    "  Standing Expiration Date:   6/6/2020   • T4, Free     Standing Status:   Future     Standing Expiration Date:   6/6/2020   • TSH     Standing Status:   Future     Standing Expiration Date:   6/6/2020   • Urinalysis With Culture If Indicated - Urine, Clean Catch     Standing Status:   Future     Standing Expiration Date:   6/6/2020   • Hemoglobin A1c     Standing Status:   Future     Standing Expiration Date:   6/6/2020   • LDL Cholesterol, Direct     Standing Status:   Future     Standing Expiration Date:   6/6/2020   • Ambulatory Referral to Dermatology     Referral Priority:   Routine     Referral Type:   Consultation     Referral Reason:   Specialty Services Required     Referred to Provider:   Daniel Cox MD     Requested Specialty:   Dermatology     Number of Visits Requested:   1   • CBC & Differential     Standing Status:   Future     Standing Expiration Date:   6/6/2020     Order Specific Question:   Manual Differential     Answer:   No       Outpatient Encounter Medications as of 6/6/2019   Medication Sig Dispense Refill   • atorvastatin (LIPITOR) 20 MG tablet Take 0.5 tablets by mouth Every Night. 45 tablet 5   • Blood Glucose Monitoring Suppl (ACURA BLOOD GLUCOSE METER) W/DEVICE kit to test FSBS 1 time per day as directed.DX:DM/E11.9, #1 glucometer, #50 strips, #100 lancets     • cetirizine (zyrTEC) 10 MG tablet Take 1 tablet by mouth Daily. 30 tablet 2   • glimepiride (AMARYL) 1 MG tablet Take 0.5 tablets by mouth Every Morning Before Breakfast. 45 tablet 5   • glucose blood test strip Use to check fsbs daily 50 each 11   • linagliptin (TRADJENTA) 5 MG tablet tablet Take 1 tablet by mouth Daily. 30 tablet 5   • losartan (COZAAR) 50 MG tablet Take 1 tablet by mouth Daily. 30 tablet 5   • [DISCONTINUED] atorvastatin (LIPITOR) 20 MG tablet Take 0.5 tablets by mouth Every Night. 45 tablet 3   • [DISCONTINUED] glimepiride (AMARYL) 1 MG tablet Take 0.5 tablets by mouth Every Morning Before Breakfast.  45 tablet 0   • [DISCONTINUED] losartan (COZAAR) 50 MG tablet TAKE 1 TABLET BY MOUTH DAILY. 30 tablet 5   • [DISCONTINUED] TRADJENTA 5 MG tablet tablet TAKE 1 TABLET BY MOUTH DAILY. 30 tablet 5   • [DISCONTINUED] predniSONE (DELTASONE) 10 MG tablet 2 tablets by mouth daily for 1 week, then 1 tablet daily for 1 week 21 tablet 0   • [DISCONTINUED] raNITIdine (ZANTAC) 150 MG tablet Take 1 tablet by mouth 2 (Two) Times a Day for 60 days. 60 tablet 1     No facility-administered encounter medications on file as of 6/6/2019.        Reviewed use of high risk medication in the elderly: yes  Reviewed for potential of harmful drug interactions in the elderly: yes    Follow Up:  Return in about 6 months (around 12/6/2019) for Next scheduled follow up, Or sooner as needed With Labs prior to appointment.     An After Visit Summary and PPPS with all of these plans were given to the patient.

## 2019-08-09 DIAGNOSIS — E11.9 TYPE 2 DIABETES MELLITUS WITHOUT COMPLICATION, WITHOUT LONG-TERM CURRENT USE OF INSULIN (HCC): Chronic | ICD-10-CM

## 2019-08-09 RX ORDER — LINAGLIPTIN 5 MG/1
TABLET, FILM COATED ORAL
Qty: 30 TABLET | Refills: 5 | Status: SHIPPED | OUTPATIENT
Start: 2019-08-09 | End: 2020-01-17 | Stop reason: SDUPTHER

## 2019-10-01 DIAGNOSIS — E11.9 TYPE 2 DIABETES MELLITUS WITHOUT COMPLICATION, WITHOUT LONG-TERM CURRENT USE OF INSULIN (HCC): Chronic | ICD-10-CM

## 2019-10-01 RX ORDER — LANCETS 30 GAUGE
EACH MISCELLANEOUS
Qty: 50 EACH | Refills: 11 | Status: SHIPPED | OUTPATIENT
Start: 2019-10-01 | End: 2021-12-10 | Stop reason: SDUPTHER

## 2019-10-08 DIAGNOSIS — I10 ESSENTIAL HYPERTENSION: Chronic | ICD-10-CM

## 2019-10-08 RX ORDER — LOSARTAN POTASSIUM 50 MG/1
50 TABLET ORAL DAILY
Qty: 30 TABLET | Refills: 5 | Status: SHIPPED | OUTPATIENT
Start: 2019-10-08 | End: 2020-06-04 | Stop reason: SDUPTHER

## 2019-11-26 ENCOUNTER — LAB (OUTPATIENT)
Dept: LAB | Facility: OTHER | Age: 64
End: 2019-11-26

## 2019-11-26 DIAGNOSIS — E78.2 MIXED HYPERLIPIDEMIA: Chronic | ICD-10-CM

## 2019-11-26 DIAGNOSIS — I10 ESSENTIAL HYPERTENSION: ICD-10-CM

## 2019-11-26 DIAGNOSIS — E11.65 TYPE 2 DIABETES MELLITUS WITH HYPERGLYCEMIA, WITHOUT LONG-TERM CURRENT USE OF INSULIN (HCC): Chronic | ICD-10-CM

## 2019-11-26 LAB
ALBUMIN SERPL-MCNC: 4.2 G/DL (ref 3.5–5)
ALBUMIN/GLOB SERPL: 1.4 G/DL (ref 1.1–1.8)
ALP SERPL-CCNC: 79 U/L (ref 38–126)
ALT SERPL W P-5'-P-CCNC: 18 U/L
ANION GAP SERPL CALCULATED.3IONS-SCNC: 5 MMOL/L (ref 5–15)
ARTICHOKE IGE QN: 81 MG/DL (ref 0–100)
AST SERPL-CCNC: 21 U/L (ref 17–59)
BACTERIA UR QL AUTO: ABNORMAL /HPF
BASOPHILS # BLD AUTO: 0.02 10*3/MM3 (ref 0–0.2)
BASOPHILS NFR BLD AUTO: 0.4 % (ref 0–1.5)
BILIRUB SERPL-MCNC: 1 MG/DL (ref 0.2–1.3)
BILIRUB UR QL STRIP: NEGATIVE
BUN BLD-MCNC: 18 MG/DL (ref 7–23)
BUN/CREAT SERPL: 20.7 (ref 7–25)
CALCIUM SPEC-SCNC: 9.1 MG/DL (ref 8.4–10.2)
CHLORIDE SERPL-SCNC: 104 MMOL/L (ref 101–112)
CLARITY UR: CLEAR
CO2 SERPL-SCNC: 32 MMOL/L (ref 22–30)
COLOR UR: YELLOW
CREAT BLD-MCNC: 0.87 MG/DL (ref 0.7–1.3)
DEPRECATED RDW RBC AUTO: 37.9 FL (ref 37–54)
EOSINOPHIL # BLD AUTO: 0.31 10*3/MM3 (ref 0–0.4)
EOSINOPHIL NFR BLD AUTO: 5.6 % (ref 0.3–6.2)
ERYTHROCYTE [DISTWIDTH] IN BLOOD BY AUTOMATED COUNT: 12.4 % (ref 12.3–15.4)
GFR SERPL CREATININE-BSD FRML MDRD: 88 ML/MIN/1.73 (ref 49–113)
GLOBULIN UR ELPH-MCNC: 2.9 GM/DL (ref 2.3–3.5)
GLUCOSE BLD-MCNC: 133 MG/DL (ref 70–99)
GLUCOSE UR STRIP-MCNC: NEGATIVE MG/DL
HBA1C MFR BLD: 7.25 % (ref 4.8–5.6)
HCT VFR BLD AUTO: 46.7 % (ref 37.5–51)
HGB BLD-MCNC: 16.4 G/DL (ref 13–17.7)
HGB UR QL STRIP.AUTO: ABNORMAL
HYALINE CASTS UR QL AUTO: ABNORMAL /LPF
KETONES UR QL STRIP: NEGATIVE
LEUKOCYTE ESTERASE UR QL STRIP.AUTO: NEGATIVE
LYMPHOCYTES # BLD AUTO: 1.66 10*3/MM3 (ref 0.7–3.1)
LYMPHOCYTES NFR BLD AUTO: 29.7 % (ref 19.6–45.3)
MCH RBC QN AUTO: 29.6 PG (ref 26.6–33)
MCHC RBC AUTO-ENTMCNC: 35.1 G/DL (ref 31.5–35.7)
MCV RBC AUTO: 84.3 FL (ref 79–97)
MONOCYTES # BLD AUTO: 0.65 10*3/MM3 (ref 0.1–0.9)
MONOCYTES NFR BLD AUTO: 11.6 % (ref 5–12)
MUCOUS THREADS URNS QL MICRO: ABNORMAL /HPF
NEUTROPHILS # BLD AUTO: 2.94 10*3/MM3 (ref 1.7–7)
NEUTROPHILS NFR BLD AUTO: 52.7 % (ref 42.7–76)
NITRITE UR QL STRIP: NEGATIVE
PH UR STRIP.AUTO: 6 [PH] (ref 5.5–8)
PLATELET # BLD AUTO: 161 10*3/MM3 (ref 140–450)
PMV BLD AUTO: 9.7 FL (ref 6–12)
POTASSIUM BLD-SCNC: 4.2 MMOL/L (ref 3.4–5)
PROT SERPL-MCNC: 7.1 G/DL (ref 6.3–8.6)
PROT UR QL STRIP: NEGATIVE
RBC # BLD AUTO: 5.54 10*6/MM3 (ref 4.14–5.8)
RBC # UR: ABNORMAL /HPF
REF LAB TEST METHOD: ABNORMAL
SODIUM BLD-SCNC: 141 MMOL/L (ref 137–145)
SP GR UR STRIP: 1.02 (ref 1–1.03)
SQUAMOUS #/AREA URNS HPF: ABNORMAL /HPF
T4 FREE SERPL-MCNC: 1.27 NG/DL (ref 0.93–1.7)
TSH SERPL DL<=0.05 MIU/L-ACNC: 3.51 UIU/ML (ref 0.27–4.2)
UROBILINOGEN UR QL STRIP: ABNORMAL
WBC NRBC COR # BLD: 5.58 10*3/MM3 (ref 3.4–10.8)
WBC UR QL AUTO: ABNORMAL /HPF

## 2019-11-26 PROCEDURE — 83036 HEMOGLOBIN GLYCOSYLATED A1C: CPT | Performed by: INTERNAL MEDICINE

## 2019-11-26 PROCEDURE — 85025 COMPLETE CBC W/AUTO DIFF WBC: CPT | Performed by: INTERNAL MEDICINE

## 2019-11-26 PROCEDURE — 84439 ASSAY OF FREE THYROXINE: CPT | Performed by: INTERNAL MEDICINE

## 2019-11-26 PROCEDURE — 36415 COLL VENOUS BLD VENIPUNCTURE: CPT | Performed by: INTERNAL MEDICINE

## 2019-11-26 PROCEDURE — 80053 COMPREHEN METABOLIC PANEL: CPT | Performed by: INTERNAL MEDICINE

## 2019-11-26 PROCEDURE — 84443 ASSAY THYROID STIM HORMONE: CPT | Performed by: INTERNAL MEDICINE

## 2019-11-26 PROCEDURE — 83721 ASSAY OF BLOOD LIPOPROTEIN: CPT | Performed by: INTERNAL MEDICINE

## 2019-11-26 PROCEDURE — 81001 URINALYSIS AUTO W/SCOPE: CPT | Performed by: INTERNAL MEDICINE

## 2019-12-05 ENCOUNTER — OFFICE VISIT (OUTPATIENT)
Dept: FAMILY MEDICINE CLINIC | Facility: CLINIC | Age: 64
End: 2019-12-05

## 2019-12-05 VITALS
TEMPERATURE: 97.1 F | SYSTOLIC BLOOD PRESSURE: 138 MMHG | BODY MASS INDEX: 23.07 KG/M2 | DIASTOLIC BLOOD PRESSURE: 88 MMHG | HEIGHT: 67 IN | HEART RATE: 72 BPM | WEIGHT: 147 LBS

## 2019-12-05 DIAGNOSIS — E78.2 MIXED HYPERLIPIDEMIA: Chronic | ICD-10-CM

## 2019-12-05 DIAGNOSIS — Z28.21 INFLUENZA VACCINATION DECLINED: ICD-10-CM

## 2019-12-05 DIAGNOSIS — I10 ESSENTIAL HYPERTENSION: Chronic | ICD-10-CM

## 2019-12-05 DIAGNOSIS — E11.65 TYPE 2 DIABETES MELLITUS WITH HYPERGLYCEMIA, WITHOUT LONG-TERM CURRENT USE OF INSULIN (HCC): Primary | Chronic | ICD-10-CM

## 2019-12-05 DIAGNOSIS — Z12.5 SCREENING FOR PROSTATE CANCER: ICD-10-CM

## 2019-12-05 PROCEDURE — 99214 OFFICE O/P EST MOD 30 MIN: CPT | Performed by: INTERNAL MEDICINE

## 2019-12-05 NOTE — PROGRESS NOTES
"Chief Complaint   Patient presents with   • Diabetes     6 mo f/u   • Hypertension     Subjective   Lui Miner is a 64 y.o. male who presents to the office for follow-up and review of labs. He has diabetes, and His blood sugar has been doing well. He is currently taking Tradjenta and glimepiride.  He is tolerating these with no side effects. He has hypertension and his blood pressure has been well controlled.  He has hyperlipidemia and takes Lipitor daily.     The following portions of the patient's history were reviewed and updated as appropriate: allergies, current medications, past family history, past medical history, past social history, past surgical history and problem list.    Review of Systems   Constitutional: Negative for chills, fatigue and fever.   HENT: Negative for congestion, sneezing, sore throat and trouble swallowing.    Eyes: Negative for visual disturbance.   Respiratory: Negative for cough, chest tightness, shortness of breath and wheezing.    Cardiovascular: Negative for chest pain, palpitations and leg swelling.   Gastrointestinal: Negative for abdominal pain, constipation, diarrhea, nausea and vomiting.   Genitourinary: Negative for dysuria, frequency and urgency.   Musculoskeletal: Negative for neck pain.   Neurological: Negative for dizziness, weakness and headaches.   Psychiatric/Behavioral:        Patient denies any feelings of depression and has not felt down, hopeless or lost interest in any activities.   All other systems reviewed and are negative.      Objective   Vitals:    12/05/19 0811 12/05/19 0830   BP: 144/88 138/88   BP Location: Left arm    Patient Position: Sitting    Cuff Size: Adult    Pulse: 72    Temp: 97.1 °F (36.2 °C)    TempSrc: Oral    Weight: 66.7 kg (147 lb)    Height: 170.2 cm (67\")    PainSc: 0-No pain       Body mass index is 23.02 kg/m².    Physical Exam   Constitutional: He is oriented to person, place, and time. He appears well-developed and " well-nourished. No distress.   HENT:   Head: Normocephalic and atraumatic.   Nose: Nose normal.   Mouth/Throat: Oropharynx is clear and moist. No oropharyngeal exudate.   Eyes: Conjunctivae and EOM are normal. Pupils are equal, round, and reactive to light. No scleral icterus.   Neck: Normal range of motion. Neck supple.   Cardiovascular: Normal rate, regular rhythm and normal heart sounds. Exam reveals no gallop and no friction rub.   No murmur heard.  Pulmonary/Chest: Effort normal and breath sounds normal. No respiratory distress. He has no wheezes. He has no rales.   Abdominal: Soft. Bowel sounds are normal. He exhibits no distension. There is no tenderness. There is no rebound and no guarding.   Musculoskeletal: Normal range of motion. He exhibits no edema.   Lymphadenopathy:     He has no cervical adenopathy.   Neurological: He is alert and oriented to person, place, and time. No cranial nerve deficit.   Skin: Skin is warm and dry. No rash noted.   A papular cystic appearing lesion on the right side of his face at the upper part of his jaw.   Psychiatric: He has a normal mood and affect. His behavior is normal. Judgment and thought content normal.   Nursing note and vitals reviewed.      Assessment/Plan   Lui was seen today for diabetes and hypertension.    Diagnoses and all orders for this visit:    Type 2 diabetes mellitus with hyperglycemia, without long-term current use of insulin (CMS/[facility])  -     Hemoglobin A1c; Future  -     Microalbumin / Creatinine Urine Ratio - Urine, Clean Catch; Future    Essential hypertension  -     CBC & Differential; Future  -     Comprehensive Metabolic Panel; Future  -     T4, Free; Future  -     TSH; Future  -     Urinalysis With Culture If Indicated -; Future    Mixed hyperlipidemia  -     Lipid Panel; Future    Screening for prostate cancer  -     PSA Screen; Future    Influenza vaccination declined         Labs are reviewed with patient.  His glucose is 133, and his  hemoglobin A1c is 7.25.  His diabetes is currently controlled.  He will continue with the current dose of Tradjenta and glimepiride. I also discussed dietary measures to help with control of the diabetes.  He may monitor blood sugar one time daily.  His LDL is at goal at 81.  He will continue with Lipitor for treatment of the hyperlipidemia.  His blood pressure is slightly elevated today compared to his previous values.  He states that he forgot to take his blood pressure medication yesterday, and has not yet taken it this morning.  The blood pressure had come down slightly on recheck.  He will continue with losartan.      Patient does not want a flu shot today.      PHQ-2/PHQ-9 Depression Screening 12/5/2019   Little interest or pleasure in doing things 0   Feeling down, depressed, or hopeless 0   Trouble falling or staying asleep, or sleeping too much -   Feeling tired or having little energy -   Poor appetite or overeating -   Feeling bad about yourself - or that you are a failure or have let yourself or your family down -   Trouble concentrating on things, such as reading the newspaper or watching television -   Moving or speaking so slowly that other people could have noticed. Or the opposite - being so fidgety or restless that you have been moving around a lot more than usual -   Thoughts that you would be better off dead, or of hurting yourself in some way -   Total Score 0         Lab on 11/26/2019   Component Date Value Ref Range Status   • Glucose 11/26/2019 133* 70 - 99 mg/dL Final   • BUN 11/26/2019 18  7 - 23 mg/dL Final   • Creatinine 11/26/2019 0.87  0.70 - 1.30 mg/dL Final   • Sodium 11/26/2019 141  137 - 145 mmol/L Final   • Potassium 11/26/2019 4.2  3.4 - 5.0 mmol/L Final   • Chloride 11/26/2019 104  101 - 112 mmol/L Final   • CO2 11/26/2019 32.0* 22.0 - 30.0 mmol/L Final   • Calcium 11/26/2019 9.1  8.4 - 10.2 mg/dL Final   • Total Protein 11/26/2019 7.1  6.3 - 8.6 g/dL Final   • Albumin 11/26/2019  4.20  3.50 - 5.00 g/dL Final   • ALT (SGPT) 11/26/2019 18  <=50 U/L Final   • AST (SGOT) 11/26/2019 21  17 - 59 U/L Final   • Alkaline Phosphatase 11/26/2019 79  38 - 126 U/L Final   • Total Bilirubin 11/26/2019 1.0  0.2 - 1.3 mg/dL Final   • eGFR Non African Amer 11/26/2019 88  49 - 113 mL/min/1.73 Final   • Globulin 11/26/2019 2.9  2.3 - 3.5 gm/dL Final   • A/G Ratio 11/26/2019 1.4  1.1 - 1.8 g/dL Final   • BUN/Creatinine Ratio 11/26/2019 20.7  7.0 - 25.0 Final   • Anion Gap 11/26/2019 5.0  5.0 - 15.0 mmol/L Final   • Free T4 11/26/2019 1.27  0.93 - 1.70 ng/dL Final   • TSH 11/26/2019 3.510  0.270 - 4.200 uIU/mL Final   • Color, UA 11/26/2019 Yellow  Yellow, Straw Final   • Appearance, UA 11/26/2019 Clear  Clear Final   • pH, UA 11/26/2019 6.0  5.5 - 8.0 Final   • Specific Gravity, UA 11/26/2019 1.025  1.005 - 1.030 Final   • Glucose, UA 11/26/2019 Negative  Negative Final   • Ketones, UA 11/26/2019 Negative  Negative Final   • Bilirubin, UA 11/26/2019 Negative  Negative Final   • Blood, UA 11/26/2019 Trace* Negative Final   • Protein, UA 11/26/2019 Negative  Negative Final   • Leuk Esterase, UA 11/26/2019 Negative  Negative Final   • Nitrite, UA 11/26/2019 Negative  Negative Final   • Urobilinogen, UA 11/26/2019 0.2 E.U./dL  0.2 - 1.0 E.U./dL Final   • Hemoglobin A1C 11/26/2019 7.25* 4.80 - 5.60 % Final   • LDL Cholesterol  11/26/2019 81  0 - 100 mg/dL Final   • WBC 11/26/2019 5.58  3.40 - 10.80 10*3/mm3 Final   • RBC 11/26/2019 5.54  4.14 - 5.80 10*6/mm3 Final   • Hemoglobin 11/26/2019 16.4  13.0 - 17.7 g/dL Final   • Hematocrit 11/26/2019 46.7  37.5 - 51.0 % Final   • MCV 11/26/2019 84.3  79.0 - 97.0 fL Final   • MCH 11/26/2019 29.6  26.6 - 33.0 pg Final   • MCHC 11/26/2019 35.1  31.5 - 35.7 g/dL Final   • RDW 11/26/2019 12.4  12.3 - 15.4 % Final   • RDW-SD 11/26/2019 37.9  37.0 - 54.0 fl Final   • MPV 11/26/2019 9.7  6.0 - 12.0 fL Final   • Platelets 11/26/2019 161  140 - 450 10*3/mm3 Final   • Neutrophil %  11/26/2019 52.7  42.7 - 76.0 % Final   • Lymphocyte % 11/26/2019 29.7  19.6 - 45.3 % Final   • Monocyte % 11/26/2019 11.6  5.0 - 12.0 % Final   • Eosinophil % 11/26/2019 5.6  0.3 - 6.2 % Final   • Basophil % 11/26/2019 0.4  0.0 - 1.5 % Final   • Neutrophils, Absolute 11/26/2019 2.94  1.70 - 7.00 10*3/mm3 Final   • Lymphocytes, Absolute 11/26/2019 1.66  0.70 - 3.10 10*3/mm3 Final   • Monocytes, Absolute 11/26/2019 0.65  0.10 - 0.90 10*3/mm3 Final   • Eosinophils, Absolute 11/26/2019 0.31  0.00 - 0.40 10*3/mm3 Final   • Basophils, Absolute 11/26/2019 0.02  0.00 - 0.20 10*3/mm3 Final   • RBC, UA 11/26/2019 3-5* None Seen /HPF Final   • WBC, UA 11/26/2019 0-2* None Seen /HPF Final   • Bacteria, UA 11/26/2019 Trace* None Seen /HPF Final   • Squamous Epithelial Cells, UA 11/26/2019 None Seen  None Seen, 0-2 /HPF Final   • Hyaline Casts, UA 11/26/2019 None Seen  None Seen /LPF Final   • Mucus, UA 11/26/2019 Moderate/2+* None Seen, Trace /HPF Final   • Methodology 11/26/2019 Manual Light Microscopy   Final   ]        .

## 2019-12-06 ENCOUNTER — OFFICE VISIT (OUTPATIENT)
Dept: OTOLARYNGOLOGY | Facility: CLINIC | Age: 64
End: 2019-12-06

## 2019-12-06 VITALS — HEIGHT: 67 IN | WEIGHT: 150 LBS | BODY MASS INDEX: 23.54 KG/M2 | OXYGEN SATURATION: 99 %

## 2019-12-06 DIAGNOSIS — L72.0 EPIDERMAL CYST OF FACE: Primary | ICD-10-CM

## 2019-12-06 PROCEDURE — 99203 OFFICE O/P NEW LOW 30 MIN: CPT | Performed by: OTOLARYNGOLOGY

## 2019-12-09 NOTE — PROGRESS NOTES
Subjective   Lui Miner is a 64 y.o. male.     History of Present Illness   Patient reports he has a lesion on the right side of his face.  It is been present for about a year.  He can express material from it with digital compression.  Is not painful.  Does not seem to be growing.  Nothing in particular brought this on.  Is not affecting his vision.      The following portions of the patient's history were reviewed and updated as appropriate: allergies, current medications, past family history, past medical history, past social history, past surgical history and problem list.      Lui Miner reports that he has never smoked. He has never used smokeless tobacco. He reports that he does not drink alcohol or use drugs.  Patient is not a tobacco user and has not been counseled for use of tobacco products    Family History   Problem Relation Age of Onset   • Breast cancer Mother    • Hypertension Mother    • Hypertension Sister    • Hypertension Sister        No Known Allergies      Current Outpatient Medications:   •  atorvastatin (LIPITOR) 20 MG tablet, Take 0.5 tablets by mouth Every Night., Disp: 45 tablet, Rfl: 5  •  Blood Glucose Monitoring Suppl (ACMisohoni BLOOD GLUCOSE METER) W/DEVICE kit, to test FSBS 1 time per day as directed.DX:DM/E11.9, #1 glucometer, #50 strips, #100 lancets, Disp: , Rfl:   •  cetirizine (zyrTEC) 10 MG tablet, Take 1 tablet by mouth Daily., Disp: 30 tablet, Rfl: 2  •  glimepiride (AMARYL) 1 MG tablet, Take 0.5 tablets by mouth Every Morning Before Breakfast., Disp: 45 tablet, Rfl: 5  •  glucose blood test strip, Use to check fsbs daily, Disp: 50 each, Rfl: 11  •  Lancets misc, Use to check fsbs daily, Disp: 50 each, Rfl: 11  •  losartan (COZAAR) 50 MG tablet, TAKE 1 TABLET BY MOUTH DAILY., Disp: 30 tablet, Rfl: 5  •  TRADJENTA 5 MG tablet tablet, TAKE 1 TABLET BY MOUTH DAILY., Disp: 30 tablet, Rfl: 5    Past Medical History:   Diagnosis Date   • Essential hypertension    • Mixed  hyperlipidemia    • Type 2 diabetes mellitus without complication, without long-term current use of insulin (CMS/HCC)        Past Surgical History:   Procedure Laterality Date   • COLONOSCOPY      Dr. Bynum   • HERNIA REPAIR     • INGUINAL HERNIA REPAIR Right    • KIDNEY STONE SURGERY      remove kidney stone and lithotripsy x 1         Review of Systems   Constitutional: Negative for fever.   Respiratory: Negative for cough.    All other systems reviewed and are negative.          Objective   Physical Exam  General: Well-developed well-nourished male in no acute distress.  Alert and oriented x3. Head: Normocephalic. Face: Symmetrical strength and appearance. PERRL. EOMI. Voice:Strong. Speech:Fluent  Ears: External ears no deformity, canals no discharge, tympanic membranes intact clear and mobile bilaterally.  Nose: Nares show no discharge mass polyp or purulence.  Boggy mucosa is present.  No gross external deformity.  Septum: Midline  Oral cavity: Lips and gums without lesions.  Tongue and floor of mouth without lesions.  Parotid and submandibular ducts unobstructed.  No mucosal lesions on the buccal mucosa or vestibule of the mouth.  Pharynx: No erythema exudate mass or ulcer  Neck: No lymphadenopathy.  No thyromegaly.  Trachea and larynx midline.  No masses in the parotid or submandibular glands.  Skin: 1.5 cm superficial cyst in the area of the right lateral canthus/zygomatic arch with what appears to be a punctum.  No erythema.      Assessment/Plan   Lui was seen today for skin lesion.    Diagnoses and all orders for this visit:    Epidermal cyst of face        Plan: Told the patient that the lesion was likely benign but that if he desired removal I would be willing to excise it under local anesthetic.  I explained the nature of the procedure to the patient including risks of bleeding, infection, poor healing, poor appearance, numbness, recurrence, possible need for further treatment depending on  final pathology, as well as the possibility of damage to one or more branches of the nerve for movement of the face which could result in permanent impairment of mobility and possibly require additional procedures.  Proposed benefit would be definitive diagnosis and hopefully definitive treatment.  The alternative would be observation which is reasonable because this is clinically benign.  The patient voices understand all the above and states he will call if he wants to proceed with excision.

## 2020-01-16 DIAGNOSIS — E11.9 TYPE 2 DIABETES MELLITUS WITHOUT COMPLICATION, WITHOUT LONG-TERM CURRENT USE OF INSULIN (HCC): Chronic | ICD-10-CM

## 2020-01-17 DIAGNOSIS — E11.9 TYPE 2 DIABETES MELLITUS WITHOUT COMPLICATION, WITHOUT LONG-TERM CURRENT USE OF INSULIN (HCC): Chronic | ICD-10-CM

## 2020-01-17 RX ORDER — LINAGLIPTIN 5 MG/1
TABLET, FILM COATED ORAL
Qty: 30 TABLET | Refills: 5 | OUTPATIENT
Start: 2020-01-17

## 2020-01-20 RX ORDER — LINAGLIPTIN 5 MG/1
TABLET, FILM COATED ORAL
Qty: 30 TABLET | Refills: 5 | OUTPATIENT
Start: 2020-01-20

## 2020-03-23 ENCOUNTER — PROCEDURE VISIT (OUTPATIENT)
Dept: OTOLARYNGOLOGY | Facility: CLINIC | Age: 65
End: 2020-03-23

## 2020-03-23 VITALS — BODY MASS INDEX: 23.61 KG/M2 | TEMPERATURE: 96.8 F | OXYGEN SATURATION: 99 % | HEIGHT: 67 IN | WEIGHT: 150.4 LBS

## 2020-03-23 DIAGNOSIS — L72.0 EPIDERMAL CYST OF FACE: Primary | ICD-10-CM

## 2020-03-23 DIAGNOSIS — L72.0 EPIDERMAL CYST OF FACE: ICD-10-CM

## 2020-03-23 PROCEDURE — 88304 TISSUE EXAM BY PATHOLOGIST: CPT | Performed by: OTOLARYNGOLOGY

## 2020-03-23 PROCEDURE — 11442 EXC FACE-MM B9+MARG 1.1-2 CM: CPT | Performed by: OTOLARYNGOLOGY

## 2020-03-23 PROCEDURE — 12052 INTMD RPR FACE/MM 2.6-5.0 CM: CPT | Performed by: OTOLARYNGOLOGY

## 2020-03-23 PROCEDURE — 88304 TISSUE EXAM BY PATHOLOGIST: CPT | Performed by: PATHOLOGY

## 2020-03-23 NOTE — PROGRESS NOTES
Procedure note    Preop diagnosis: Cyst right face    Postop diagnosis: Same    Procedure: Excision of cyst right face 1.5 cm with 4.5 cm layered closure    Surgeon: Dr. Lorenzana    Anesthesia: 1% Xylocaine with epinephrine    Description of procedure: After again explained the nature of the procedure to the patient in layman's terms including risks of bleeding, infection, poor healing, poor appearance, numbness, and possible damage to the nerves for closure of the eye or movement of the forehead as well as the benefit of definitive diagnosis and definitive treatment and the alternative of observation informed consent was confirmed.  Patient had questions regarding what would happen if he were to become quarantined with coronavirus infection and I explained that his sutures would have to remain until after he was free from quarantine.  I did give him the opportunity to reschedule the procedure but he decided he wanted to proceed.  The skin overlying the palpable mass on the right face was cleansed with alcohol and an elliptical incision was designed to follow the rihytids of the lateral canthus.  The punctum of the cyst was included in the skin paddle.  This was infiltrated with 1% Xylocaine with epinephrine and prepped and draped sterilely.  Incision was made sharply through the subcutaneous tissue.  The capsule of the cyst was encountered and was dissected free of the surrounding soft tissue.  Specimen was sent to pathology.  Wound was irrigated thoroughly with saline and the subcutaneous tissues were closed with interrupted 5-0 Vicryl.  Skin was closed with running 5-0 nylon.  Bacitracin ointment was applied.  Procedure was terminated.  Patient tolerated the procedure well.  He was instructed in local wound care and advised to return in 1 week for suture removal.

## 2020-03-30 ENCOUNTER — OFFICE VISIT (OUTPATIENT)
Dept: OTOLARYNGOLOGY | Facility: CLINIC | Age: 65
End: 2020-03-30

## 2020-03-30 VITALS — WEIGHT: 150.8 LBS | BODY MASS INDEX: 23.67 KG/M2 | OXYGEN SATURATION: 97 % | TEMPERATURE: 96.7 F | HEIGHT: 67 IN

## 2020-03-30 DIAGNOSIS — Z48.817 AFTERCARE FOLLOWING SURGERY OF THE SKIN OR SUBCUTANEOUS TISSUE: Primary | ICD-10-CM

## 2020-03-30 PROCEDURE — 99024 POSTOP FOLLOW-UP VISIT: CPT | Performed by: OTOLARYNGOLOGY

## 2020-03-30 NOTE — PROGRESS NOTES
Subjective   Lui Miner is a 65 y.o. male.       History of Present Illness   Patient is one-week status post excision of an epidermal inclusion cyst from the right face.  Pathology was consistent with epidermal inclusion cyst.  Patient is having no specific complaints.      The following portions of the patient's history were reviewed and updated as appropriate: allergies, current medications, past family history, past medical history, past social history, past surgical history and problem list.     reports that he has never smoked. He has never used smokeless tobacco. He reports that he does not drink alcohol or use drugs.   Patient is not a tobacco user and has not been counseled for use of tobacco products      Review of Systems        Objective   Physical Exam  Incision intact.  No evidence of infection.  All external sutures are removed.      Assessment/Plan   Lui was seen today for skin lesion.    Diagnoses and all orders for this visit:    Aftercare following surgery of the skin or subcutaneous tissue        Plan: Sutures removed as described above.  May follow-up with me as needed.

## 2020-03-31 LAB
LAB AP CASE REPORT: NORMAL
LAB AP CLINICAL INFORMATION: NORMAL
LAB AP DIAGNOSIS COMMENT: NORMAL
PATH REPORT.FINAL DX SPEC: NORMAL
PATH REPORT.GROSS SPEC: NORMAL

## 2020-04-21 ENCOUNTER — OFFICE VISIT (OUTPATIENT)
Dept: OTOLARYNGOLOGY | Facility: CLINIC | Age: 65
End: 2020-04-21

## 2020-04-21 VITALS — HEIGHT: 67 IN | BODY MASS INDEX: 23.54 KG/M2 | OXYGEN SATURATION: 98 % | WEIGHT: 150 LBS

## 2020-04-21 DIAGNOSIS — T81.30XA EXTRUSION OF SUTURE, INITIAL ENCOUNTER: Primary | ICD-10-CM

## 2020-04-21 PROCEDURE — 99212 OFFICE O/P EST SF 10 MIN: CPT | Performed by: OTOLARYNGOLOGY

## 2020-04-21 NOTE — PROGRESS NOTES
Subjective   Lui Miner is a 65 y.o. male.       History of Present Illness   Patient is status post excision of an epidermal cyst of the right face performed on 3/23/20.  Has noticed in the last couple days some whitish suture protruding from the incision.  No pain.  No bleeding.  No purulence.    The following portions of the patient's history were reviewed and updated as appropriate: allergies, current medications, past family history, past medical history, past social history, past surgical history and problem list.     reports that he has never smoked. He has never used smokeless tobacco. He reports that he does not drink alcohol or use drugs.   Patient is not a tobacco user and has not been counseled for use of tobacco products      Review of Systems        Objective   Physical Exam  There are 3 extruding Vicryl sutures in the incision line.  Under the microscope these are trimmed and removed using suture scissors and forceps.  There is no associated purulence or granulation tissue      Assessment/Plan   Lui was seen today for follow-up.    Diagnoses and all orders for this visit:    Extrusion of suture, initial encounter        Plan: Extruding sutures removed as described above.  With no evidence of infection I do not think any additional treatment is needed.  Follow-up with me as needed for further problems.

## 2020-06-01 ENCOUNTER — LAB (OUTPATIENT)
Dept: LAB | Facility: OTHER | Age: 65
End: 2020-06-01

## 2020-06-01 DIAGNOSIS — I10 ESSENTIAL HYPERTENSION: ICD-10-CM

## 2020-06-01 DIAGNOSIS — E11.65 TYPE 2 DIABETES MELLITUS WITH HYPERGLYCEMIA, WITHOUT LONG-TERM CURRENT USE OF INSULIN (HCC): Chronic | ICD-10-CM

## 2020-06-01 DIAGNOSIS — Z12.5 SCREENING FOR PROSTATE CANCER: ICD-10-CM

## 2020-06-01 DIAGNOSIS — E78.2 MIXED HYPERLIPIDEMIA: Chronic | ICD-10-CM

## 2020-06-01 LAB
ALBUMIN SERPL-MCNC: 4.2 G/DL (ref 3.5–5)
ALBUMIN UR-MCNC: <1.2 MG/DL
ALBUMIN/GLOB SERPL: 1.4 G/DL (ref 1.1–1.8)
ALP SERPL-CCNC: 80 U/L (ref 38–126)
ALT SERPL W P-5'-P-CCNC: 19 U/L
ANION GAP SERPL CALCULATED.3IONS-SCNC: 8 MMOL/L (ref 5–15)
AST SERPL-CCNC: 20 U/L (ref 17–59)
BASOPHILS # BLD AUTO: 0.01 10*3/MM3 (ref 0–0.2)
BASOPHILS NFR BLD AUTO: 0.1 % (ref 0–1.5)
BILIRUB SERPL-MCNC: 0.7 MG/DL (ref 0.2–1.3)
BILIRUB UR QL STRIP: NEGATIVE
BUN BLD-MCNC: 18 MG/DL (ref 7–23)
BUN/CREAT SERPL: 21.7 (ref 7–25)
CALCIUM SPEC-SCNC: 8.8 MG/DL (ref 8.4–10.2)
CHLORIDE SERPL-SCNC: 101 MMOL/L (ref 101–112)
CHOLEST SERPL-MCNC: 153 MG/DL (ref 150–200)
CLARITY UR: CLEAR
CO2 SERPL-SCNC: 30 MMOL/L (ref 22–30)
COLOR UR: YELLOW
CREAT BLD-MCNC: 0.83 MG/DL (ref 0.7–1.3)
CREAT UR-MCNC: 89.3 MG/DL
DEPRECATED RDW RBC AUTO: 39.9 FL (ref 37–54)
EOSINOPHIL # BLD AUTO: 0.11 10*3/MM3 (ref 0–0.4)
EOSINOPHIL NFR BLD AUTO: 1.1 % (ref 0.3–6.2)
ERYTHROCYTE [DISTWIDTH] IN BLOOD BY AUTOMATED COUNT: 12.6 % (ref 12.3–15.4)
GFR SERPL CREATININE-BSD FRML MDRD: 93 ML/MIN/1.73 (ref 49–113)
GLOBULIN UR ELPH-MCNC: 2.9 GM/DL (ref 2.3–3.5)
GLUCOSE BLD-MCNC: 182 MG/DL (ref 70–99)
GLUCOSE UR STRIP-MCNC: ABNORMAL MG/DL
HBA1C MFR BLD: 7.91 % (ref 4.8–5.6)
HCT VFR BLD AUTO: 46.7 % (ref 37.5–51)
HDLC SERPL-MCNC: 64 MG/DL (ref 40–59)
HGB BLD-MCNC: 16.3 G/DL (ref 13–17.7)
HGB UR QL STRIP.AUTO: NEGATIVE
KETONES UR QL STRIP: ABNORMAL
LDLC SERPL CALC-MCNC: 73 MG/DL
LDLC/HDLC SERPL: 1.14 {RATIO} (ref 0–3.55)
LEUKOCYTE ESTERASE UR QL STRIP.AUTO: NEGATIVE
LYMPHOCYTES # BLD AUTO: 2.26 10*3/MM3 (ref 0.7–3.1)
LYMPHOCYTES NFR BLD AUTO: 23.2 % (ref 19.6–45.3)
MCH RBC QN AUTO: 30.4 PG (ref 26.6–33)
MCHC RBC AUTO-ENTMCNC: 34.9 G/DL (ref 31.5–35.7)
MCV RBC AUTO: 87.1 FL (ref 79–97)
MICROALBUMIN/CREAT UR: NORMAL MG/G{CREAT}
MONOCYTES # BLD AUTO: 0.94 10*3/MM3 (ref 0.1–0.9)
MONOCYTES NFR BLD AUTO: 9.6 % (ref 5–12)
NEUTROPHILS # BLD AUTO: 6.44 10*3/MM3 (ref 1.7–7)
NEUTROPHILS NFR BLD AUTO: 66 % (ref 42.7–76)
NITRITE UR QL STRIP: NEGATIVE
PH UR STRIP.AUTO: 6 [PH] (ref 5.5–8)
PLATELET # BLD AUTO: 182 10*3/MM3 (ref 140–450)
PMV BLD AUTO: 9.6 FL (ref 6–12)
POTASSIUM BLD-SCNC: 4.1 MMOL/L (ref 3.4–5)
PROT SERPL-MCNC: 7.1 G/DL (ref 6.3–8.6)
PROT UR QL STRIP: NEGATIVE
PSA SERPL-MCNC: 4.44 NG/ML (ref 0–4)
RBC # BLD AUTO: 5.36 10*6/MM3 (ref 4.14–5.8)
SODIUM BLD-SCNC: 139 MMOL/L (ref 137–145)
SP GR UR STRIP: >=1.03 (ref 1–1.03)
T4 FREE SERPL-MCNC: 1.11 NG/DL (ref 0.93–1.7)
TRIGL SERPL-MCNC: 81 MG/DL
TSH SERPL DL<=0.05 MIU/L-ACNC: 1.96 UIU/ML (ref 0.27–4.2)
UROBILINOGEN UR QL STRIP: ABNORMAL
VLDLC SERPL-MCNC: 16.2 MG/DL
WBC NRBC COR # BLD: 9.76 10*3/MM3 (ref 3.4–10.8)

## 2020-06-01 PROCEDURE — 80053 COMPREHEN METABOLIC PANEL: CPT | Performed by: INTERNAL MEDICINE

## 2020-06-01 PROCEDURE — 85025 COMPLETE CBC W/AUTO DIFF WBC: CPT | Performed by: INTERNAL MEDICINE

## 2020-06-01 PROCEDURE — 80061 LIPID PANEL: CPT | Performed by: INTERNAL MEDICINE

## 2020-06-01 PROCEDURE — G0103 PSA SCREENING: HCPCS | Performed by: INTERNAL MEDICINE

## 2020-06-01 PROCEDURE — 83036 HEMOGLOBIN GLYCOSYLATED A1C: CPT | Performed by: INTERNAL MEDICINE

## 2020-06-01 PROCEDURE — 84439 ASSAY OF FREE THYROXINE: CPT | Performed by: INTERNAL MEDICINE

## 2020-06-01 PROCEDURE — 82570 ASSAY OF URINE CREATININE: CPT | Performed by: INTERNAL MEDICINE

## 2020-06-01 PROCEDURE — 36415 COLL VENOUS BLD VENIPUNCTURE: CPT | Performed by: INTERNAL MEDICINE

## 2020-06-01 PROCEDURE — 82043 UR ALBUMIN QUANTITATIVE: CPT | Performed by: INTERNAL MEDICINE

## 2020-06-01 PROCEDURE — 81003 URINALYSIS AUTO W/O SCOPE: CPT | Performed by: INTERNAL MEDICINE

## 2020-06-01 PROCEDURE — 84443 ASSAY THYROID STIM HORMONE: CPT | Performed by: INTERNAL MEDICINE

## 2020-06-04 ENCOUNTER — OFFICE VISIT (OUTPATIENT)
Dept: FAMILY MEDICINE CLINIC | Facility: CLINIC | Age: 65
End: 2020-06-04

## 2020-06-04 DIAGNOSIS — E78.2 MIXED HYPERLIPIDEMIA: Chronic | ICD-10-CM

## 2020-06-04 DIAGNOSIS — L50.8 CHRONIC URTICARIA: Chronic | ICD-10-CM

## 2020-06-04 DIAGNOSIS — R97.20 ELEVATED PSA: ICD-10-CM

## 2020-06-04 DIAGNOSIS — I10 ESSENTIAL HYPERTENSION: Chronic | ICD-10-CM

## 2020-06-04 DIAGNOSIS — E11.65 TYPE 2 DIABETES MELLITUS WITH HYPERGLYCEMIA, WITHOUT LONG-TERM CURRENT USE OF INSULIN (HCC): Primary | Chronic | ICD-10-CM

## 2020-06-04 PROCEDURE — 99443 PR PHYS/QHP TELEPHONE EVALUATION 21-30 MIN: CPT | Performed by: INTERNAL MEDICINE

## 2020-06-04 RX ORDER — LOSARTAN POTASSIUM 50 MG/1
50 TABLET ORAL DAILY
Qty: 30 TABLET | Refills: 5 | Status: SHIPPED | OUTPATIENT
Start: 2020-06-04 | End: 2020-12-08 | Stop reason: SDUPTHER

## 2020-06-04 RX ORDER — GLIMEPIRIDE 1 MG/1
1 TABLET ORAL
Qty: 30 TABLET | Refills: 5 | Status: SHIPPED | OUTPATIENT
Start: 2020-06-04 | End: 2020-07-14

## 2020-06-04 RX ORDER — ATORVASTATIN CALCIUM 20 MG/1
10 TABLET, FILM COATED ORAL NIGHTLY
Qty: 45 TABLET | Refills: 5 | Status: SHIPPED | OUTPATIENT
Start: 2020-06-04 | End: 2020-12-08 | Stop reason: SDUPTHER

## 2020-06-04 NOTE — PROGRESS NOTES
TELEMEDICINE VISIT    Chief Complaint   Patient presents with   • Diabetes     F/U WITH LABS   • Hypertension   • Hyperlipidemia     You have chosen to receive care through a telephone visit. Do you consent to use a telephone visit for your medical care today? Yes      Subjective   Lui Miner is a 65 y.o. male who is seen by telephone visit for follow-up and review of labs. He has diabetes, and His blood sugar has been doing well. He is currently taking Tradjenta and glimepiride.  He is tolerating these with no side effects. He has hypertension and his blood pressure has been well controlled.  He has hyperlipidemia and takes Lipitor daily.  He has been having episodes of urticaria.  He was recently seen in the urgent care center.  He took a Medrol Dosepak and is now taking Allegra.  He is concerned about alpha gal, as he has had some tick bites in the past.  He breaks out in large whelps on his body during the episodes of the urticarial flareups.    The following portions of the patient's history were reviewed and updated as appropriate: allergies, current medications, past family history, past medical history, past social history, past surgical history and problem list.    Review of Systems   Constitutional: Negative for chills, fatigue and fever.   HENT: Negative for congestion, sneezing, sore throat and trouble swallowing.    Eyes: Negative for visual disturbance.   Respiratory: Negative for cough, chest tightness, shortness of breath and wheezing.    Cardiovascular: Negative for chest pain, palpitations and leg swelling.   Gastrointestinal: Negative for abdominal pain, constipation, diarrhea, nausea and vomiting.   Genitourinary: Negative for dysuria, frequency and urgency.   Musculoskeletal: Negative for neck pain.   Skin: Positive for rash.   Neurological: Negative for dizziness, weakness and headaches.   Psychiatric/Behavioral:        Patient denies any feelings of depression and has not felt down,  hopeless or lost interest in any activities.   All other systems reviewed and are negative.      Objective   There were no vitals filed for this visit.   There is no height or weight on file to calculate BMI.    Physical Exam   Constitutional: He is oriented to person, place, and time. He appears well-developed and well-nourished. No distress.   HENT:   Head: Normocephalic and atraumatic.   Nose: Nose normal.   Mouth/Throat: Oropharynx is clear and moist. No oropharyngeal exudate.   Eyes: Pupils are equal, round, and reactive to light. Conjunctivae and EOM are normal. No scleral icterus.   Neck: Normal range of motion. Neck supple.   Cardiovascular: Normal rate, regular rhythm and normal heart sounds. Exam reveals no gallop and no friction rub.   No murmur heard.  Pulmonary/Chest: Effort normal and breath sounds normal. No respiratory distress. He has no wheezes. He has no rales.   Abdominal: Soft. Bowel sounds are normal. He exhibits no distension. There is no tenderness. There is no rebound and no guarding.   Musculoskeletal: Normal range of motion. He exhibits no edema.   Lymphadenopathy:     He has no cervical adenopathy.   Neurological: He is alert and oriented to person, place, and time. No cranial nerve deficit.   Skin: Skin is warm and dry. No rash noted.   A papular cystic appearing lesion on the right side of his face at the upper part of his jaw.   Psychiatric: He has a normal mood and affect. His behavior is normal. Judgment and thought content normal.   Nursing note and vitals reviewed.      Assessment/Plan   Lui was seen today for diabetes, hypertension and hyperlipidemia.    Diagnoses and all orders for this visit:    Type 2 diabetes mellitus with hyperglycemia, without long-term current use of insulin (CMS/[facility])  -     Hemoglobin A1c; Future  -     glimepiride (AMARYL) 1 MG tablet; Take 1 tablet by mouth Every Morning Before Breakfast.  -     linagliptin (Tradjenta) 5 MG tablet tablet; Take 1  tablet by mouth Daily.    Essential hypertension  -     CBC & Differential; Future  -     Comprehensive Metabolic Panel; Future  -     T4, Free; Future  -     TSH; Future  -     Urinalysis With Culture If Indicated -; Future  -     losartan (COZAAR) 50 MG tablet; Take 1 tablet by mouth Daily.    Mixed hyperlipidemia  -     LDL Cholesterol, Direct; Future  -     atorvastatin (LIPITOR) 20 MG tablet; Take 0.5 tablets by mouth Every Night.    Elevated PSA  -     Ambulatory Referral to Urology    Chronic urticaria  -     Alpha - Gal Panel; Future         Labs are reviewed with patient.  His glucose is elevated at 182, and his hemoglobin A1c is 7.91. He will continue with the current dose of Tradjenta.  I will increase glimepiride to 1 mg daily.  I also discussed dietary measures to help with control of the diabetes.  He may monitor blood sugar one time daily.  His lipids are at goal.  He will continue with Lipitor for treatment of the hyperlipidemia.  His blood pressure is controlled, and he will continue with his current blood pressure medication.  His PSA is elevated at 4.44.  This is up from 2.23 last year.  I will refer him to urology for consult due to the elevated PSA.  I will order an alpha gal panel for evaluation of the chronic urticaria/rash.    This visit has been rescheduled as a phone visit to comply with patient safety concerns in accordance with CDC recommendations. Total time of discussion was 25 minutes.      PHQ-2/PHQ-9 Depression Screening 6/4/2020   Little interest or pleasure in doing things 0   Feeling down, depressed, or hopeless 0   Trouble falling or staying asleep, or sleeping too much -   Feeling tired or having little energy -   Poor appetite or overeating -   Feeling bad about yourself - or that you are a failure or have let yourself or your family down -   Trouble concentrating on things, such as reading the newspaper or watching television -   Moving or speaking so slowly that other people  could have noticed. Or the opposite - being so fidgety or restless that you have been moving around a lot more than usual -   Thoughts that you would be better off dead, or of hurting yourself in some way -   Total Score 0         Lab on 06/01/2020   Component Date Value Ref Range Status   • Glucose 06/01/2020 182* 70 - 99 mg/dL Final   • BUN 06/01/2020 18  7 - 23 mg/dL Final   • Creatinine 06/01/2020 0.83  0.70 - 1.30 mg/dL Final   • Sodium 06/01/2020 139  137 - 145 mmol/L Final   • Potassium 06/01/2020 4.1  3.4 - 5.0 mmol/L Final   • Chloride 06/01/2020 101  101 - 112 mmol/L Final   • CO2 06/01/2020 30.0  22.0 - 30.0 mmol/L Final   • Calcium 06/01/2020 8.8  8.4 - 10.2 mg/dL Final   • Total Protein 06/01/2020 7.1  6.3 - 8.6 g/dL Final   • Albumin 06/01/2020 4.20  3.50 - 5.00 g/dL Final   • ALT (SGPT) 06/01/2020 19  <=50 U/L Final   • AST (SGOT) 06/01/2020 20  17 - 59 U/L Final   • Alkaline Phosphatase 06/01/2020 80  38 - 126 U/L Final   • Total Bilirubin 06/01/2020 0.7  0.2 - 1.3 mg/dL Final   • eGFR Non African Amer 06/01/2020 93  49 - 113 mL/min/1.73 Final   • Globulin 06/01/2020 2.9  2.3 - 3.5 gm/dL Final   • A/G Ratio 06/01/2020 1.4  1.1 - 1.8 g/dL Final   • BUN/Creatinine Ratio 06/01/2020 21.7  7.0 - 25.0 Final   • Anion Gap 06/01/2020 8.0  5.0 - 15.0 mmol/L Final   • Free T4 06/01/2020 1.11  0.93 - 1.70 ng/dL Final   • TSH 06/01/2020 1.960  0.270 - 4.200 uIU/mL Final   • Color, UA 06/01/2020 Yellow  Yellow, Straw Final   • Appearance, UA 06/01/2020 Clear  Clear Final   • pH, UA 06/01/2020 6.0  5.5 - 8.0 Final   • Specific Gravity, UA 06/01/2020 >=1.030  1.005 - 1.030 Final   • Glucose, UA 06/01/2020 >=1000 mg/dL (3+)* Negative Final   • Ketones, UA 06/01/2020 Trace* Negative Final   • Bilirubin, UA 06/01/2020 Negative  Negative Final   • Blood, UA 06/01/2020 Negative  Negative Final   • Protein, UA 06/01/2020 Negative  Negative Final   • Leuk Esterase, UA 06/01/2020 Negative  Negative Final   • Nitrite, UA  06/01/2020 Negative  Negative Final   • Urobilinogen, UA 06/01/2020 0.2 E.U./dL  0.2 - 1.0 E.U./dL Final   • Hemoglobin A1C 06/01/2020 7.91* 4.80 - 5.60 % Final   • Total Cholesterol 06/01/2020 153  150 - 200 mg/dL Final   • Triglycerides 06/01/2020 81  <=150 mg/dL Final   • HDL Cholesterol 06/01/2020 64* 40 - 59 mg/dL Final   • LDL Cholesterol  06/01/2020 73  <=100 mg/dL Final   • VLDL Cholesterol 06/01/2020 16.2  mg/dL Final   • LDL/HDL Ratio 06/01/2020 1.14  0.00 - 3.55 Final   • Microalbumin/Creatinine Ratio 06/01/2020    Final    Unable to calculate   • Creatinine, Urine 06/01/2020 89.3  mg/dL Final   • Microalbumin, Urine 06/01/2020 <1.2  mg/dL Final   • PSA 06/01/2020 4.440* 0.000 - 4.000 ng/mL Final   • WBC 06/01/2020 9.76  3.40 - 10.80 10*3/mm3 Final   • RBC 06/01/2020 5.36  4.14 - 5.80 10*6/mm3 Final   • Hemoglobin 06/01/2020 16.3  13.0 - 17.7 g/dL Final   • Hematocrit 06/01/2020 46.7  37.5 - 51.0 % Final   • MCV 06/01/2020 87.1  79.0 - 97.0 fL Final   • MCH 06/01/2020 30.4  26.6 - 33.0 pg Final   • MCHC 06/01/2020 34.9  31.5 - 35.7 g/dL Final   • RDW 06/01/2020 12.6  12.3 - 15.4 % Final   • RDW-SD 06/01/2020 39.9  37.0 - 54.0 fl Final   • MPV 06/01/2020 9.6  6.0 - 12.0 fL Final   • Platelets 06/01/2020 182  140 - 450 10*3/mm3 Final   • Neutrophil % 06/01/2020 66.0  42.7 - 76.0 % Final   • Lymphocyte % 06/01/2020 23.2  19.6 - 45.3 % Final   • Monocyte % 06/01/2020 9.6  5.0 - 12.0 % Final   • Eosinophil % 06/01/2020 1.1  0.3 - 6.2 % Final   • Basophil % 06/01/2020 0.1  0.0 - 1.5 % Final   • Neutrophils, Absolute 06/01/2020 6.44  1.70 - 7.00 10*3/mm3 Final   • Lymphocytes, Absolute 06/01/2020 2.26  0.70 - 3.10 10*3/mm3 Final   • Monocytes, Absolute 06/01/2020 0.94* 0.10 - 0.90 10*3/mm3 Final   • Eosinophils, Absolute 06/01/2020 0.11  0.00 - 0.40 10*3/mm3 Final   • Basophils, Absolute 06/01/2020 0.01  0.00 - 0.20 10*3/mm3 Final   ]        .

## 2020-06-09 ENCOUNTER — LAB (OUTPATIENT)
Dept: LAB | Facility: OTHER | Age: 65
End: 2020-06-09

## 2020-06-09 DIAGNOSIS — L50.8 CHRONIC URTICARIA: Chronic | ICD-10-CM

## 2020-06-09 PROCEDURE — 86003 ALLG SPEC IGE CRUDE XTRC EA: CPT | Performed by: INTERNAL MEDICINE

## 2020-06-09 PROCEDURE — 86008 ALLG SPEC IGE RECOMB EA: CPT | Performed by: INTERNAL MEDICINE

## 2020-06-09 PROCEDURE — 36415 COLL VENOUS BLD VENIPUNCTURE: CPT | Performed by: INTERNAL MEDICINE

## 2020-06-12 LAB
ALPHA GAL IGE: 4.67 KU/L
BEEF IGE QN: 1.63 KU/L
LAMB IGE QN: 0.3 KU/L
Lab: 1
Lab: 2
Lab: ABNORMAL
PORK IGE: 0.61 KU/L

## 2020-06-15 ENCOUNTER — TELEPHONE (OUTPATIENT)
Dept: FAMILY MEDICINE CLINIC | Facility: CLINIC | Age: 65
End: 2020-06-15

## 2020-06-15 DIAGNOSIS — Z91.018 ALLERGY TO ALPHA-GAL: Primary | ICD-10-CM

## 2020-07-14 DIAGNOSIS — E11.65 TYPE 2 DIABETES MELLITUS WITH HYPERGLYCEMIA, WITHOUT LONG-TERM CURRENT USE OF INSULIN (HCC): Chronic | ICD-10-CM

## 2020-07-14 RX ORDER — LINAGLIPTIN 5 MG/1
TABLET, FILM COATED ORAL
Qty: 30 TABLET | Refills: 5 | Status: SHIPPED | OUTPATIENT
Start: 2020-07-14 | End: 2021-01-14

## 2020-07-14 RX ORDER — GLIMEPIRIDE 1 MG/1
TABLET ORAL
Qty: 45 TABLET | Refills: 5 | Status: SHIPPED | OUTPATIENT
Start: 2020-07-14 | End: 2021-02-26 | Stop reason: SDUPTHER

## 2020-12-01 ENCOUNTER — LAB (OUTPATIENT)
Dept: LAB | Facility: OTHER | Age: 65
End: 2020-12-01

## 2020-12-01 DIAGNOSIS — I10 ESSENTIAL HYPERTENSION: ICD-10-CM

## 2020-12-01 DIAGNOSIS — E11.65 TYPE 2 DIABETES MELLITUS WITH HYPERGLYCEMIA, WITHOUT LONG-TERM CURRENT USE OF INSULIN (HCC): Chronic | ICD-10-CM

## 2020-12-01 DIAGNOSIS — E78.2 MIXED HYPERLIPIDEMIA: Chronic | ICD-10-CM

## 2020-12-01 LAB
ALBUMIN SERPL-MCNC: 4.2 G/DL (ref 3.5–5)
ALBUMIN/GLOB SERPL: 1.4 G/DL (ref 1.1–1.8)
ALP SERPL-CCNC: 84 U/L (ref 38–126)
ALT SERPL W P-5'-P-CCNC: 16 U/L
ANION GAP SERPL CALCULATED.3IONS-SCNC: 7 MMOL/L (ref 5–15)
ARTICHOKE IGE QN: 72 MG/DL (ref 0–100)
AST SERPL-CCNC: 23 U/L (ref 17–59)
BACTERIA UR QL AUTO: ABNORMAL /HPF
BASOPHILS # BLD AUTO: 0.02 10*3/MM3 (ref 0–0.2)
BASOPHILS NFR BLD AUTO: 0.3 % (ref 0–1.5)
BILIRUB SERPL-MCNC: 1 MG/DL (ref 0.2–1.3)
BILIRUB UR QL STRIP: NEGATIVE
BUN SERPL-MCNC: 19 MG/DL (ref 7–23)
BUN/CREAT SERPL: 19.6 (ref 7–25)
CALCIUM SPEC-SCNC: 9.1 MG/DL (ref 8.4–10.2)
CHLORIDE SERPL-SCNC: 102 MMOL/L (ref 101–112)
CLARITY UR: CLEAR
CO2 SERPL-SCNC: 30 MMOL/L (ref 22–30)
COLOR UR: YELLOW
CREAT SERPL-MCNC: 0.97 MG/DL (ref 0.7–1.3)
DEPRECATED RDW RBC AUTO: 38.9 FL (ref 37–54)
EOSINOPHIL # BLD AUTO: 0.66 10*3/MM3 (ref 0–0.4)
EOSINOPHIL NFR BLD AUTO: 8.8 % (ref 0.3–6.2)
ERYTHROCYTE [DISTWIDTH] IN BLOOD BY AUTOMATED COUNT: 12.5 % (ref 12.3–15.4)
GFR SERPL CREATININE-BSD FRML MDRD: 77 ML/MIN/1.73 (ref 49–113)
GLOBULIN UR ELPH-MCNC: 2.9 GM/DL (ref 2.3–3.5)
GLUCOSE SERPL-MCNC: 139 MG/DL (ref 70–99)
GLUCOSE UR STRIP-MCNC: NEGATIVE MG/DL
HBA1C MFR BLD: 6.6 % (ref 4.8–5.6)
HCT VFR BLD AUTO: 45.3 % (ref 37.5–51)
HGB BLD-MCNC: 15.5 G/DL (ref 13–17.7)
HGB UR QL STRIP.AUTO: ABNORMAL
HYALINE CASTS UR QL AUTO: ABNORMAL /LPF
KETONES UR QL STRIP: NEGATIVE
LEUKOCYTE ESTERASE UR QL STRIP.AUTO: NEGATIVE
LYMPHOCYTES # BLD AUTO: 2.57 10*3/MM3 (ref 0.7–3.1)
LYMPHOCYTES NFR BLD AUTO: 34.4 % (ref 19.6–45.3)
MCH RBC QN AUTO: 29.4 PG (ref 26.6–33)
MCHC RBC AUTO-ENTMCNC: 34.2 G/DL (ref 31.5–35.7)
MCV RBC AUTO: 86 FL (ref 79–97)
MONOCYTES # BLD AUTO: 0.77 10*3/MM3 (ref 0.1–0.9)
MONOCYTES NFR BLD AUTO: 10.3 % (ref 5–12)
NEUTROPHILS NFR BLD AUTO: 3.45 10*3/MM3 (ref 1.7–7)
NEUTROPHILS NFR BLD AUTO: 46.2 % (ref 42.7–76)
NITRITE UR QL STRIP: NEGATIVE
PH UR STRIP.AUTO: 5.5 [PH] (ref 5.5–8)
PLATELET # BLD AUTO: 175 10*3/MM3 (ref 140–450)
PMV BLD AUTO: 9.4 FL (ref 6–12)
POTASSIUM SERPL-SCNC: 3.9 MMOL/L (ref 3.4–5)
PROT SERPL-MCNC: 7.1 G/DL (ref 6.3–8.6)
PROT UR QL STRIP: NEGATIVE
RBC # BLD AUTO: 5.27 10*6/MM3 (ref 4.14–5.8)
RBC # UR: ABNORMAL /HPF
REF LAB TEST METHOD: ABNORMAL
SODIUM SERPL-SCNC: 139 MMOL/L (ref 137–145)
SP GR UR STRIP: >=1.03 (ref 1–1.03)
SQUAMOUS #/AREA URNS HPF: ABNORMAL /HPF
T4 FREE SERPL-MCNC: 1.2 NG/DL (ref 0.93–1.7)
TSH SERPL DL<=0.05 MIU/L-ACNC: 3.71 UIU/ML (ref 0.27–4.2)
UROBILINOGEN UR QL STRIP: ABNORMAL
WBC # BLD AUTO: 7.47 10*3/MM3 (ref 3.4–10.8)
WBC UR QL AUTO: ABNORMAL /HPF

## 2020-12-01 PROCEDURE — 84443 ASSAY THYROID STIM HORMONE: CPT | Performed by: INTERNAL MEDICINE

## 2020-12-01 PROCEDURE — 83721 ASSAY OF BLOOD LIPOPROTEIN: CPT | Performed by: INTERNAL MEDICINE

## 2020-12-01 PROCEDURE — 84439 ASSAY OF FREE THYROXINE: CPT | Performed by: INTERNAL MEDICINE

## 2020-12-01 PROCEDURE — 81001 URINALYSIS AUTO W/SCOPE: CPT | Performed by: INTERNAL MEDICINE

## 2020-12-01 PROCEDURE — 80053 COMPREHEN METABOLIC PANEL: CPT | Performed by: INTERNAL MEDICINE

## 2020-12-01 PROCEDURE — 83036 HEMOGLOBIN GLYCOSYLATED A1C: CPT | Performed by: INTERNAL MEDICINE

## 2020-12-01 PROCEDURE — 36415 COLL VENOUS BLD VENIPUNCTURE: CPT | Performed by: INTERNAL MEDICINE

## 2020-12-01 PROCEDURE — 85025 COMPLETE CBC W/AUTO DIFF WBC: CPT | Performed by: INTERNAL MEDICINE

## 2020-12-08 ENCOUNTER — OFFICE VISIT (OUTPATIENT)
Dept: FAMILY MEDICINE CLINIC | Facility: CLINIC | Age: 65
End: 2020-12-08

## 2020-12-08 VITALS
WEIGHT: 146 LBS | HEIGHT: 67 IN | OXYGEN SATURATION: 97 % | SYSTOLIC BLOOD PRESSURE: 124 MMHG | BODY MASS INDEX: 22.91 KG/M2 | DIASTOLIC BLOOD PRESSURE: 86 MMHG | HEART RATE: 64 BPM

## 2020-12-08 DIAGNOSIS — Z23 PNEUMOCOCCAL VACCINATION GIVEN: ICD-10-CM

## 2020-12-08 DIAGNOSIS — Z00.00 MEDICARE ANNUAL WELLNESS VISIT, SUBSEQUENT: Primary | ICD-10-CM

## 2020-12-08 DIAGNOSIS — R97.20 ELEVATED PSA: ICD-10-CM

## 2020-12-08 DIAGNOSIS — K21.9 GASTROESOPHAGEAL REFLUX DISEASE WITHOUT ESOPHAGITIS: Chronic | ICD-10-CM

## 2020-12-08 DIAGNOSIS — E11.65 TYPE 2 DIABETES MELLITUS WITH HYPERGLYCEMIA, WITHOUT LONG-TERM CURRENT USE OF INSULIN (HCC): Chronic | ICD-10-CM

## 2020-12-08 DIAGNOSIS — E78.2 MIXED HYPERLIPIDEMIA: Chronic | ICD-10-CM

## 2020-12-08 DIAGNOSIS — I10 ESSENTIAL HYPERTENSION: Chronic | ICD-10-CM

## 2020-12-08 PROCEDURE — 99214 OFFICE O/P EST MOD 30 MIN: CPT | Performed by: INTERNAL MEDICINE

## 2020-12-08 PROCEDURE — 90732 PPSV23 VACC 2 YRS+ SUBQ/IM: CPT | Performed by: INTERNAL MEDICINE

## 2020-12-08 PROCEDURE — 1159F MED LIST DOCD IN RCRD: CPT | Performed by: INTERNAL MEDICINE

## 2020-12-08 PROCEDURE — 1170F FXNL STATUS ASSESSED: CPT | Performed by: INTERNAL MEDICINE

## 2020-12-08 PROCEDURE — G0439 PPPS, SUBSEQ VISIT: HCPCS | Performed by: INTERNAL MEDICINE

## 2020-12-08 PROCEDURE — G0009 ADMIN PNEUMOCOCCAL VACCINE: HCPCS | Performed by: INTERNAL MEDICINE

## 2020-12-08 RX ORDER — ATORVASTATIN CALCIUM 20 MG/1
10 TABLET, FILM COATED ORAL NIGHTLY
Qty: 45 TABLET | Refills: 5 | Status: SHIPPED | OUTPATIENT
Start: 2020-12-08 | End: 2021-06-11 | Stop reason: SDUPTHER

## 2020-12-08 RX ORDER — LOSARTAN POTASSIUM 50 MG/1
50 TABLET ORAL DAILY
Qty: 30 TABLET | Refills: 5 | Status: SHIPPED | OUTPATIENT
Start: 2020-12-08 | End: 2021-06-11 | Stop reason: SDUPTHER

## 2020-12-08 NOTE — PROGRESS NOTES
QUICK REFERENCE INFORMATION:  The ABCs of the Annual Wellness Visit    Subsequent Medicare Wellness Visit    HEALTH RISK ASSESSMENT    : 1955    Recent Hospitalizations:  No hospitalization(s) within the last year..        Current Medical Providers:  Patient Care Team:  Kaiser Hassan MD as PCP - General (Family Medicine)  Mannie Collins DPM as Consulting Physician (Podiatry)  Paresh Lloyd OD as Consulting Physician (Optometry)        Smoking Status:  Social History     Tobacco Use   Smoking Status Never Smoker   Smokeless Tobacco Never Used       Alcohol Consumption:  Social History     Substance and Sexual Activity   Alcohol Use No       Depression Screen:   PHQ-2/PHQ-9 Depression Screening 2020   Little interest or pleasure in doing things 0   Feeling down, depressed, or hopeless 0   Trouble falling or staying asleep, or sleeping too much -   Feeling tired or having little energy -   Poor appetite or overeating -   Feeling bad about yourself - or that you are a failure or have let yourself or your family down -   Trouble concentrating on things, such as reading the newspaper or watching television -   Moving or speaking so slowly that other people could have noticed. Or the opposite - being so fidgety or restless that you have been moving around a lot more than usual -   Thoughts that you would be better off dead, or of hurting yourself in some way -   Total Score 0       Health Habits and Functional and Cognitive Screening:  Functional & Cognitive Status 2020   Do you have difficulty preparing food and eating? No   Do you have difficulty bathing yourself, getting dressed or grooming yourself? No   Do you have difficulty using the toilet? No   Do you have difficulty moving around from place to place? No   Do you have trouble with steps or getting out of a bed or a chair? No   Current Diet Limited Junk Food   Dental Exam Not up to date   Eye Exam Up to date   Exercise (times per  week) 3 times per week   Current Exercise Activities Include Housecleaning   Do you need help using the phone?  No   Are you deaf or do you have serious difficulty hearing?  No   Do you need help with transportation? No   Do you need help shopping? No   Do you need help preparing meals?  No   Do you need help with housework?  No   Do you need help with laundry? No   Do you need help taking your medications? No   Do you need help managing money? No   Do you ever drive or ride in a car without wearing a seat belt? No   Have you felt unusual stress, anger or loneliness in the last month? No   Who do you live with? Spouse   If you need help, do you have trouble finding someone available to you? No   Have you been bothered in the last four weeks by sexual problems? No   Do you have difficulty concentrating, remembering or making decisions? No           Does the patient have evidence of cognitive impairment? No    Asiprin use counseling: Does not take ASA      Recent Lab Results:       Lab Results   Component Value Date    HGBA1C 6.60 (H) 12/01/2020     Lab Results   Component Value Date    CHOL 153 06/01/2020    TRIG 81 06/01/2020    HDL 64 (H) 06/01/2020    VLDL 16.2 06/01/2020    LDLHDL 1.14 06/01/2020           Age-appropriate Screening Schedule:  Refer to the list below for future screening recommendations based on patient's age, sex and/or medical conditions. Orders for these recommended tests are listed in the plan section. The patient has been provided with a written plan.    Health Maintenance   Topic Date Due   • ZOSTER VACCINE (1 of 2) 03/17/2004   • DIABETIC FOOT EXAM  11/28/2018   • DIABETIC EYE EXAM  10/18/2019   • INFLUENZA VACCINE  12/08/2021 (Originally 8/1/2020)   • PROSTATE CANCER SCREENING  06/01/2021   • HEMOGLOBIN A1C  06/01/2021   • URINE MICROALBUMIN  06/01/2021   • LIPID PANEL  12/01/2021   • TDAP/TD VACCINES (2 - Td) 11/06/2024   • COLONOSCOPY  01/18/2027        Subjective   History of Present  Illness    Lui Miner is a 65 y.o. male who presents for an Annual Wellness Visit.    The following portions of the patient's history were reviewed and updated as appropriate: allergies, current medications, past family history, past medical history, past social history, past surgical history and problem list.    Outpatient Medications Prior to Visit   Medication Sig Dispense Refill   • Blood Glucose Monitoring Suppl (ACURA BLOOD GLUCOSE METER) W/DEVICE kit to test FSBS 1 time per day as directed.DX:DM/E11.9, #1 glucometer, #50 strips, #100 lancets     • famotidine (PEPCID) 20 MG tablet Take 1 tablet by mouth 2 (Two) Times a Day. 30 tablet 0   • fexofenadine (ALLEGRA) 180 MG tablet Take 1 tablet by mouth Daily. 90 tablet 0   • glimepiride (AMARYL) 1 MG tablet TAKE 1/2 TABLETS BY MOUTH EVERY MORNING BEFORE BREAKFAST. 45 tablet 5   • glucose blood test strip Use to check fsbs daily 50 each 11   • Lancets misc Use to check fsbs daily 50 each 11   • TRADJENTA 5 MG tablet tablet TAKE 1 TABLET BY MOUTH DAILY. 30 tablet 5   • atorvastatin (LIPITOR) 20 MG tablet Take 0.5 tablets by mouth Every Night. 45 tablet 5   • losartan (COZAAR) 50 MG tablet Take 1 tablet by mouth Daily. 30 tablet 5     No facility-administered medications prior to visit.        Patient Active Problem List   Diagnosis   • Type 2 diabetes mellitus with hyperglycemia, without long-term current use of insulin (CMS/[facility])   • Mixed hyperlipidemia   • Essential hypertension   • Chronic urticaria   • Elevated PSA   • Gastroesophageal reflux disease without esophagitis       Advance Care Planning:  ACP Discussion Status: ACP discussion was held with the patient during this visit. Patient does not have an advance directive, declines further assistance.      Identification of Risk Factors:  Risk factors include: Diabetes, Hypertension.    Review of Systems    Compared to one year ago, the patient feels his physical health is the same.  Compared to one year  "ago, the patient feels his mental health is the same.    Objective     Physical Exam    Vitals:    12/08/20 0902   BP: 124/86   Pulse: 64   SpO2: 97%   Weight: 66.2 kg (146 lb)   Height: 170.2 cm (67\")   PainSc: 0-No pain       Patient's Body mass index is 22.87 kg/m². BMI is within normal parameters. No follow-up required..      Assessment/Plan   Patient Self-Management and Personalized Health Advice  The patient has been provided with information about: diet and exercise and preventive services including:   · Exercise counseling provided, Fall Risk assessment done, Nutrition counseling provided, Pneumococcal vaccine , Prostate cancer screening discussed.  Recommend annual influenza vaccine.  I have reminded him of the importance of an annual diabetic eye exam.  Recommend Shingrix vaccine for shingles.    Visit Diagnoses:    ICD-10-CM ICD-9-CM   1. Medicare annual wellness visit, subsequent  Z00.00 V70.0   2. Type 2 diabetes mellitus with hyperglycemia, without long-term current use of insulin (CMS/[facility])  E11.65 250.00     790.29   3. Essential hypertension  I10 401.9   4. Mixed hyperlipidemia  E78.2 272.2   5. Elevated PSA  R97.20 790.93   6. Gastroesophageal reflux disease without esophagitis  K21.9 530.81   7. Pneumococcal vaccination given  Z23 V06.6       Orders Placed This Encounter   Procedures   • Pneumococcal Polysaccharide Vaccine 23-Valent Greater Than or Equal To 1yo Subcutaneous / IM   • Comprehensive Metabolic Panel     Standing Status:   Future     Standing Expiration Date:   12/8/2021   • T4, Free     Standing Status:   Future     Standing Expiration Date:   12/8/2021   • TSH     Standing Status:   Future     Standing Expiration Date:   12/8/2021   • Urinalysis With Culture If Indicated -     Standing Status:   Future     Standing Expiration Date:   12/8/2021   • Hemoglobin A1c     Standing Status:   Future     Standing Expiration Date:   12/8/2021   • Lipid Panel     Standing Status:   Future     " Standing Expiration Date:   12/8/2021   • Microalbumin / Creatinine Urine Ratio - Urine, Clean Catch     Standing Status:   Future     Standing Expiration Date:   12/8/2021   • PSA DIAGNOSTIC     Standing Status:   Future     Standing Expiration Date:   12/8/2021   • CBC & Differential     Standing Status:   Future     Standing Expiration Date:   12/8/2021     Order Specific Question:   Manual Differential     Answer:   No       Outpatient Encounter Medications as of 12/8/2020   Medication Sig Dispense Refill   • atorvastatin (LIPITOR) 20 MG tablet Take 0.5 tablets by mouth Every Night. 45 tablet 5   • Blood Glucose Monitoring Suppl (ACShoette BLOOD GLUCOSE METER) W/DEVICE kit to test FSBS 1 time per day as directed.DX:DM/E11.9, #1 glucometer, #50 strips, #100 lancets     • famotidine (PEPCID) 20 MG tablet Take 1 tablet by mouth 2 (Two) Times a Day. 30 tablet 0   • fexofenadine (ALLEGRA) 180 MG tablet Take 1 tablet by mouth Daily. 90 tablet 0   • glimepiride (AMARYL) 1 MG tablet TAKE 1/2 TABLETS BY MOUTH EVERY MORNING BEFORE BREAKFAST. 45 tablet 5   • glucose blood test strip Use to check fsbs daily 50 each 11   • Lancets misc Use to check fsbs daily 50 each 11   • losartan (COZAAR) 50 MG tablet Take 1 tablet by mouth Daily. 30 tablet 5   • TRADJENTA 5 MG tablet tablet TAKE 1 TABLET BY MOUTH DAILY. 30 tablet 5   • [DISCONTINUED] atorvastatin (LIPITOR) 20 MG tablet Take 0.5 tablets by mouth Every Night. 45 tablet 5   • [DISCONTINUED] losartan (COZAAR) 50 MG tablet Take 1 tablet by mouth Daily. 30 tablet 5     No facility-administered encounter medications on file as of 12/8/2020.        Reviewed use of high risk medication in the elderly: yes  Reviewed for potential of harmful drug interactions in the elderly: yes    Follow Up:  Return in about 6 months (around 6/8/2021) for Next scheduled follow up, Or sooner as needed With Labs prior to appointment.     An After Visit Summary and PPPS with all of these plans were given  to the patient.

## 2020-12-08 NOTE — PROGRESS NOTES
Subjective   Lui Miner is a 65 y.o. male who presents to the office for follow-up and review of labs.  He has diabetes and his blood sugar has been well controlled.  He takes Tradjenta and glimepiride.  He is tolerating these without any side effects.  He has hypertension and his blood pressure has been doing well.  He has hyperlipidemia and takes Lipitor daily.  He takes Pepcid for treatment of GERD.  At the last visit, he was having episodes of urticaria and rash.  I obtained an alpha gal panel which was positive.  I then referred him for allergy evaluation.  His symptoms are doing much better.  He continues to follow with the allergist.  He also has a diagnosis of an elevated PSA.  I referred him to urology for evaluation of the PSA.  He missed the appointment, and has not rescheduled it.    The following portions of the patient's history were reviewed and updated as appropriate: allergies, current medications, past family history, past medical history, past social history, past surgical history and problem list.    Review of Systems   Constitutional: Negative for chills, fatigue and fever.   HENT: Negative for congestion, sneezing, sore throat and trouble swallowing.    Eyes: Negative for visual disturbance.   Respiratory: Negative for cough, chest tightness, shortness of breath and wheezing.    Cardiovascular: Negative for chest pain, palpitations and leg swelling.   Gastrointestinal: Negative for abdominal pain, constipation, diarrhea, nausea and vomiting.   Genitourinary: Negative for dysuria, frequency and urgency.   Musculoskeletal: Negative for neck pain.   Skin: Negative for rash.   Neurological: Negative for dizziness, weakness and headaches.   Psychiatric/Behavioral:        Patient denies any feelings of depression and has not felt down, hopeless or lost interest in any activities.   All other systems reviewed and are negative.      Objective   Vitals:    12/08/20 0902   BP: 124/86   Pulse: 64  "  SpO2: 97%   Weight: 66.2 kg (146 lb)   Height: 170.2 cm (67\")  Comment: per patient   PainSc: 0-No pain      Body mass index is 22.87 kg/m².    Physical Exam   Constitutional: He is oriented to person, place, and time. He appears well-developed. No distress.   HENT:   Head: Normocephalic and atraumatic.   Nose: Nose normal.   Eyes: Pupils are equal, round, and reactive to light. Conjunctivae are normal. No scleral icterus.   Neck: Normal range of motion. Neck supple.   Cardiovascular: Normal rate, regular rhythm and normal heart sounds. Exam reveals no gallop and no friction rub.   No murmur heard.  Pulmonary/Chest: Effort normal and breath sounds normal. No respiratory distress. He has no wheezes. He has no rales.   Abdominal: Soft. Bowel sounds are normal. He exhibits no distension. There is no abdominal tenderness. There is no rebound and no guarding.   Musculoskeletal: Normal range of motion.   Lymphadenopathy:     He has no cervical adenopathy.   Neurological: He is alert and oriented to person, place, and time. No cranial nerve deficit.   Skin: Skin is warm and dry. No rash noted.   Psychiatric: His behavior is normal. Judgment and thought content normal.   Nursing note and vitals reviewed.      Assessment/Plan   Diagnoses and all orders for this visit:    1. Medicare annual wellness visit, subsequent (Primary)    2. Type 2 diabetes mellitus with hyperglycemia, without long-term current use of insulin (CMS/Formerly [facility])  -     Hemoglobin A1c; Future  -     Microalbumin / Creatinine Urine Ratio - Urine, Clean Catch; Future    3. Essential hypertension  -     CBC & Differential; Future  -     Comprehensive Metabolic Panel; Future  -     T4, Free; Future  -     TSH; Future  -     Urinalysis With Culture If Indicated -; Future  -     losartan (COZAAR) 50 MG tablet; Take 1 tablet by mouth Daily.  Dispense: 30 tablet; Refill: 5    4. Mixed hyperlipidemia  -     Lipid Panel; Future  -     atorvastatin (LIPITOR) 20 MG " tablet; Take 0.5 tablets by mouth Every Night.  Dispense: 45 tablet; Refill: 5    5. Elevated PSA  -     PSA DIAGNOSTIC; Future    6. Gastroesophageal reflux disease without esophagitis    7. Pneumococcal vaccination given  -     Pneumococcal Polysaccharide Vaccine 23-Valent Greater Than or Equal To 3yo Subcutaneous / IM         Labs are reviewed with patient.  His glucose is 139 with a hemoglobin A1c of 6.60.  His diabetes is well controlled.  He will continue with his current diabetic medication.  He may monitor blood sugar 1 time daily.  His LDL is 72.  He will continue with Lipitor for treatment of hyperlipidemia.  He will continue with Pepcid for treatment of GERD.  His blood pressure is well controlled, and he will continue with his current blood pressure medication.  He will follow-up with his allergist as scheduled for ongoing management of the alpha gal allergy.  I have urged him to call and reschedule his urology appointment.  I will check a diagnostic PSA with his next labs.     Patient does not want a flu shot today.  He is given a Pneumovax 23 today.    PHQ-2/PHQ-9 Depression Screening 12/8/2020   Little interest or pleasure in doing things 0   Feeling down, depressed, or hopeless 0   Trouble falling or staying asleep, or sleeping too much -   Feeling tired or having little energy -   Poor appetite or overeating -   Feeling bad about yourself - or that you are a failure or have let yourself or your family down -   Trouble concentrating on things, such as reading the newspaper or watching television -   Moving or speaking so slowly that other people could have noticed. Or the opposite - being so fidgety or restless that you have been moving around a lot more than usual -   Thoughts that you would be better off dead, or of hurting yourself in some way -   Total Score 0         Lab on 12/01/2020   Component Date Value Ref Range Status   • Glucose 12/01/2020 139* 70 - 99 mg/dL Final   • BUN 12/01/2020 19  7  - 23 mg/dL Final   • Creatinine 12/01/2020 0.97  0.70 - 1.30 mg/dL Final   • Sodium 12/01/2020 139  137 - 145 mmol/L Final   • Potassium 12/01/2020 3.9  3.4 - 5.0 mmol/L Final   • Chloride 12/01/2020 102  101 - 112 mmol/L Final   • CO2 12/01/2020 30.0  22.0 - 30.0 mmol/L Final   • Calcium 12/01/2020 9.1  8.4 - 10.2 mg/dL Final   • Total Protein 12/01/2020 7.1  6.3 - 8.6 g/dL Final   • Albumin 12/01/2020 4.20  3.50 - 5.00 g/dL Final   • ALT (SGPT) 12/01/2020 16  <=50 U/L Final   • AST (SGOT) 12/01/2020 23  17 - 59 U/L Final   • Alkaline Phosphatase 12/01/2020 84  38 - 126 U/L Final   • Total Bilirubin 12/01/2020 1.0  0.2 - 1.3 mg/dL Final   • eGFR Non African Amer 12/01/2020 77  49 - 113 mL/min/1.73 Final   • Globulin 12/01/2020 2.9  2.3 - 3.5 gm/dL Final   • A/G Ratio 12/01/2020 1.4  1.1 - 1.8 g/dL Final   • BUN/Creatinine Ratio 12/01/2020 19.6  7.0 - 25.0 Final   • Anion Gap 12/01/2020 7.0  5.0 - 15.0 mmol/L Final   • Free T4 12/01/2020 1.20  0.93 - 1.70 ng/dL Final   • TSH 12/01/2020 3.710  0.270 - 4.200 uIU/mL Final   • Color, UA 12/01/2020 Yellow  Yellow, Straw Final   • Appearance, UA 12/01/2020 Clear  Clear Final   • pH, UA 12/01/2020 5.5  5.5 - 8.0 Final   • Specific Gravity, UA 12/01/2020 >=1.030  1.005 - 1.030 Final   • Glucose, UA 12/01/2020 Negative  Negative Final   • Ketones, UA 12/01/2020 Negative  Negative Final   • Bilirubin, UA 12/01/2020 Negative  Negative Final   • Blood, UA 12/01/2020 Trace* Negative Final   • Protein, UA 12/01/2020 Negative  Negative Final   • Leuk Esterase, UA 12/01/2020 Negative  Negative Final   • Nitrite, UA 12/01/2020 Negative  Negative Final   • Urobilinogen, UA 12/01/2020 0.2 E.U./dL  0.2 - 1.0 E.U./dL Final   • Hemoglobin A1C 12/01/2020 6.60* 4.80 - 5.60 % Final   • LDL Cholesterol  12/01/2020 72  0 - 100 mg/dL Final   • WBC 12/01/2020 7.47  3.40 - 10.80 10*3/mm3 Final   • RBC 12/01/2020 5.27  4.14 - 5.80 10*6/mm3 Final   • Hemoglobin 12/01/2020 15.5  13.0 - 17.7 g/dL  Final   • Hematocrit 12/01/2020 45.3  37.5 - 51.0 % Final   • MCV 12/01/2020 86.0  79.0 - 97.0 fL Final   • MCH 12/01/2020 29.4  26.6 - 33.0 pg Final   • MCHC 12/01/2020 34.2  31.5 - 35.7 g/dL Final   • RDW 12/01/2020 12.5  12.3 - 15.4 % Final   • RDW-SD 12/01/2020 38.9  37.0 - 54.0 fl Final   • MPV 12/01/2020 9.4  6.0 - 12.0 fL Final   • Platelets 12/01/2020 175  140 - 450 10*3/mm3 Final   • Neutrophil % 12/01/2020 46.2  42.7 - 76.0 % Final   • Lymphocyte % 12/01/2020 34.4  19.6 - 45.3 % Final   • Monocyte % 12/01/2020 10.3  5.0 - 12.0 % Final   • Eosinophil % 12/01/2020 8.8* 0.3 - 6.2 % Final   • Basophil % 12/01/2020 0.3  0.0 - 1.5 % Final   • Neutrophils, Absolute 12/01/2020 3.45  1.70 - 7.00 10*3/mm3 Final   • Lymphocytes, Absolute 12/01/2020 2.57  0.70 - 3.10 10*3/mm3 Final   • Monocytes, Absolute 12/01/2020 0.77  0.10 - 0.90 10*3/mm3 Final   • Eosinophils, Absolute 12/01/2020 0.66* 0.00 - 0.40 10*3/mm3 Final   • Basophils, Absolute 12/01/2020 0.02  0.00 - 0.20 10*3/mm3 Final   • RBC, UA 12/01/2020 0-2* None Seen /HPF Final   • WBC, UA 12/01/2020 None Seen  None Seen /HPF Final   • Bacteria, UA 12/01/2020 None Seen  None Seen /HPF Final   • Squamous Epithelial Cells, UA 12/01/2020 None Seen  None Seen, 0-2 /HPF Final   • Hyaline Casts, UA 12/01/2020 None Seen  None Seen /LPF Final   • Methodology 12/01/2020 Manual Light Microscopy   Final   ]        .  .

## 2020-12-29 ENCOUNTER — LAB (OUTPATIENT)
Dept: LAB | Facility: OTHER | Age: 65
End: 2020-12-29

## 2020-12-29 ENCOUNTER — TRANSCRIBE ORDERS (OUTPATIENT)
Dept: GENERAL RADIOLOGY | Facility: CLINIC | Age: 65
End: 2020-12-29

## 2020-12-29 DIAGNOSIS — T78.1XXD OTHER ADVERSE FOOD REACTIONS, NOT ELSEWHERE CLASSIFIED, SUBSEQUENT ENCOUNTER: ICD-10-CM

## 2020-12-29 DIAGNOSIS — T78.1XXD OTHER ADVERSE FOOD REACTIONS, NOT ELSEWHERE CLASSIFIED, SUBSEQUENT ENCOUNTER: Primary | ICD-10-CM

## 2020-12-29 PROCEDURE — 86003 ALLG SPEC IGE CRUDE XTRC EA: CPT | Performed by: ALLERGY & IMMUNOLOGY

## 2020-12-29 PROCEDURE — 86003 ALLG SPEC IGE CRUDE XTRC EA: CPT

## 2020-12-29 PROCEDURE — 36415 COLL VENOUS BLD VENIPUNCTURE: CPT | Performed by: INTERNAL MEDICINE

## 2020-12-29 PROCEDURE — 86008 ALLG SPEC IGE RECOMB EA: CPT | Performed by: ALLERGY & IMMUNOLOGY

## 2020-12-29 PROCEDURE — 82785 ASSAY OF IGE: CPT | Performed by: ALLERGY & IMMUNOLOGY

## 2021-01-02 LAB
COW MILK IGE QN: 0.41 KU/L
IGE SERPL-ACNC: 83 IU/ML (ref 6–495)

## 2021-01-07 LAB
ALPHA-GAL IGE QN: 8.63 KU/L
BEEF IGE QN: 2.89 KU/L
DEPRECATED BEEF IGE RAST QL: 2
DEPRECATED LAMB IGE RAST QL: 1
DEPRECATED PORK IGE RAST QL: 2
LAMB IGE QN: 0.44 KU/L
PORK IGE QN: 1.5 KU/L

## 2021-01-13 DIAGNOSIS — E11.65 TYPE 2 DIABETES MELLITUS WITH HYPERGLYCEMIA, WITHOUT LONG-TERM CURRENT USE OF INSULIN (HCC): Chronic | ICD-10-CM

## 2021-01-14 RX ORDER — LINAGLIPTIN 5 MG/1
TABLET, FILM COATED ORAL
Qty: 30 TABLET | Refills: 5 | Status: SHIPPED | OUTPATIENT
Start: 2021-01-14 | End: 2021-08-16

## 2021-01-20 LAB
REF LAB TEST RESULTS: NORMAL
REF LAB TEST RESULTS: NORMAL

## 2021-02-26 DIAGNOSIS — E11.65 TYPE 2 DIABETES MELLITUS WITH HYPERGLYCEMIA, WITHOUT LONG-TERM CURRENT USE OF INSULIN (HCC): Chronic | ICD-10-CM

## 2021-02-26 RX ORDER — GLIMEPIRIDE 1 MG/1
1 TABLET ORAL DAILY
Qty: 90 TABLET | Refills: 3 | Status: SHIPPED | OUTPATIENT
Start: 2021-02-26 | End: 2021-12-10 | Stop reason: SDUPTHER

## 2021-02-26 NOTE — TELEPHONE ENCOUNTER
[facility] pharmacy called and states that the patient needs a refill on his glimepride 1 mg tablet -1/2 tablet daily       The patient states that he has been taking 1 tablet daily     Please advise how the patient is supposed to take the medication I reviewed the office visit and dint see anything thanks

## 2021-06-04 ENCOUNTER — LAB (OUTPATIENT)
Dept: LAB | Facility: OTHER | Age: 66
End: 2021-06-04

## 2021-06-04 DIAGNOSIS — I10 ESSENTIAL HYPERTENSION: ICD-10-CM

## 2021-06-04 DIAGNOSIS — E78.2 MIXED HYPERLIPIDEMIA: Chronic | ICD-10-CM

## 2021-06-04 DIAGNOSIS — E11.65 TYPE 2 DIABETES MELLITUS WITH HYPERGLYCEMIA, WITHOUT LONG-TERM CURRENT USE OF INSULIN (HCC): Chronic | ICD-10-CM

## 2021-06-04 DIAGNOSIS — R97.20 ELEVATED PSA: ICD-10-CM

## 2021-06-04 LAB
ALBUMIN SERPL-MCNC: 3.9 G/DL (ref 3.5–5)
ALBUMIN UR-MCNC: <1.2 MG/DL
ALBUMIN/GLOB SERPL: 1.4 G/DL (ref 1.1–1.8)
ALP SERPL-CCNC: 88 U/L (ref 38–126)
ALT SERPL W P-5'-P-CCNC: 18 U/L
ANION GAP SERPL CALCULATED.3IONS-SCNC: 6 MMOL/L (ref 5–15)
AST SERPL-CCNC: 23 U/L (ref 17–59)
BILIRUB SERPL-MCNC: 0.7 MG/DL (ref 0.2–1.3)
BILIRUB UR QL STRIP: NEGATIVE
BUN SERPL-MCNC: 18 MG/DL (ref 7–23)
BUN/CREAT SERPL: 18.8 (ref 7–25)
CALCIUM SPEC-SCNC: 9 MG/DL (ref 8.4–10.2)
CHLORIDE SERPL-SCNC: 101 MMOL/L (ref 101–112)
CHOLEST SERPL-MCNC: 130 MG/DL (ref 150–200)
CLARITY UR: CLEAR
CO2 SERPL-SCNC: 31 MMOL/L (ref 22–30)
COLOR UR: ABNORMAL
CREAT SERPL-MCNC: 0.96 MG/DL (ref 0.7–1.3)
CREAT UR-MCNC: 173.2 MG/DL
DEPRECATED RDW RBC AUTO: 38.9 FL (ref 37–54)
EOSINOPHIL # BLD MANUAL: 0.5 10*3/MM3 (ref 0–0.4)
EOSINOPHIL NFR BLD MANUAL: 8 % (ref 0.3–6.2)
ERYTHROCYTE [DISTWIDTH] IN BLOOD BY AUTOMATED COUNT: 12.4 % (ref 12.3–15.4)
GFR SERPL CREATININE-BSD FRML MDRD: 78 ML/MIN/1.73 (ref 49–113)
GLOBULIN UR ELPH-MCNC: 2.7 GM/DL (ref 2.3–3.5)
GLUCOSE SERPL-MCNC: 131 MG/DL (ref 70–99)
GLUCOSE UR STRIP-MCNC: ABNORMAL MG/DL
HBA1C MFR BLD: 6.4 % (ref 4.8–5.6)
HCT VFR BLD AUTO: 46.6 % (ref 37.5–51)
HDLC SERPL-MCNC: 47 MG/DL (ref 40–59)
HGB BLD-MCNC: 16.7 G/DL (ref 13–17.7)
HGB UR QL STRIP.AUTO: NEGATIVE
KETONES UR QL STRIP: NEGATIVE
LDLC SERPL CALC-MCNC: 64 MG/DL
LDLC/HDLC SERPL: 1.33 {RATIO} (ref 0–3.55)
LEUKOCYTE ESTERASE UR QL STRIP.AUTO: NEGATIVE
LYMPHOCYTES # BLD MANUAL: 1.8 10*3/MM3 (ref 0.7–3.1)
LYMPHOCYTES NFR BLD MANUAL: 29 % (ref 19.6–45.3)
LYMPHOCYTES NFR BLD MANUAL: 8 % (ref 5–12)
MCH RBC QN AUTO: 31.1 PG (ref 26.6–33)
MCHC RBC AUTO-ENTMCNC: 35.8 G/DL (ref 31.5–35.7)
MCV RBC AUTO: 86.8 FL (ref 79–97)
MICROALBUMIN/CREAT UR: NORMAL MG/G{CREAT}
MONOCYTES # BLD AUTO: 0.5 10*3/MM3 (ref 0.1–0.9)
NEUTROPHILS # BLD AUTO: 3.42 10*3/MM3 (ref 1.7–7)
NEUTROPHILS NFR BLD MANUAL: 55 % (ref 42.7–76)
NITRITE UR QL STRIP: NEGATIVE
PH UR STRIP.AUTO: 5.5 [PH] (ref 5.5–8)
PLATELET # BLD AUTO: 175 10*3/MM3 (ref 140–450)
PMV BLD AUTO: 10 FL (ref 6–12)
POTASSIUM SERPL-SCNC: 4 MMOL/L (ref 3.4–5)
PROT SERPL-MCNC: 6.6 G/DL (ref 6.3–8.6)
PROT UR QL STRIP: NEGATIVE
PSA SERPL-MCNC: 4.29 NG/ML (ref 0–4)
RBC # BLD AUTO: 5.37 10*6/MM3 (ref 4.14–5.8)
RBC MORPH BLD: NORMAL
SMALL PLATELETS BLD QL SMEAR: ADEQUATE
SODIUM SERPL-SCNC: 138 MMOL/L (ref 137–145)
SP GR UR STRIP: >=1.03 (ref 1–1.03)
T4 FREE SERPL-MCNC: 1.32 NG/DL (ref 0.93–1.7)
TRIGL SERPL-MCNC: 103 MG/DL
TSH SERPL DL<=0.05 MIU/L-ACNC: 2.99 UIU/ML (ref 0.27–4.2)
UROBILINOGEN UR QL STRIP: ABNORMAL
VLDLC SERPL-MCNC: 19 MG/DL (ref 5–40)
WBC # BLD AUTO: 6.22 10*3/MM3 (ref 3.4–10.8)
WBC MORPH BLD: NORMAL

## 2021-06-04 PROCEDURE — 82043 UR ALBUMIN QUANTITATIVE: CPT | Performed by: INTERNAL MEDICINE

## 2021-06-04 PROCEDURE — 84439 ASSAY OF FREE THYROXINE: CPT | Performed by: INTERNAL MEDICINE

## 2021-06-04 PROCEDURE — 82570 ASSAY OF URINE CREATININE: CPT | Performed by: INTERNAL MEDICINE

## 2021-06-04 PROCEDURE — 83036 HEMOGLOBIN GLYCOSYLATED A1C: CPT | Performed by: INTERNAL MEDICINE

## 2021-06-04 PROCEDURE — 80053 COMPREHEN METABOLIC PANEL: CPT | Performed by: INTERNAL MEDICINE

## 2021-06-04 PROCEDURE — 81003 URINALYSIS AUTO W/O SCOPE: CPT | Performed by: INTERNAL MEDICINE

## 2021-06-04 PROCEDURE — 36415 COLL VENOUS BLD VENIPUNCTURE: CPT | Performed by: INTERNAL MEDICINE

## 2021-06-04 PROCEDURE — 85025 COMPLETE CBC W/AUTO DIFF WBC: CPT | Performed by: INTERNAL MEDICINE

## 2021-06-04 PROCEDURE — 84443 ASSAY THYROID STIM HORMONE: CPT | Performed by: INTERNAL MEDICINE

## 2021-06-04 PROCEDURE — 84153 ASSAY OF PSA TOTAL: CPT | Performed by: INTERNAL MEDICINE

## 2021-06-04 PROCEDURE — 80061 LIPID PANEL: CPT | Performed by: INTERNAL MEDICINE

## 2021-06-09 PROBLEM — R97.20 ELEVATED PSA: Chronic | Status: ACTIVE | Noted: 2020-06-04

## 2021-06-10 PROBLEM — Z91.018 ALLERGY TO ALPHA-GAL: Chronic | Status: ACTIVE | Noted: 2021-06-10

## 2021-06-10 NOTE — PROGRESS NOTES
Subjective   Lui Miner is a 66 y.o. male who presents to the office for follow-up and review of labs.  He has diabetes and his blood sugar has been doing well. He takes Tradjenta and glimepiride.  He is tolerating these without any side effects.  He has hypertension and his blood pressure has been controlled.  He has hyperlipidemia and takes Lipitor 20 mg daily.  He takes Pepcid for treatment of GERD.  He has alpha gal and follows with an allergy specialist.  He also has a diagnosis of an elevated PSA.  I have referred him to see urology, but he did not keep the appointment.  He occasionally has episodes of urinary frequency.    History of Present Illness has been reviewed and validated on 06/11/2021 and updated with any changes.    The following portions of the patient's history were reviewed and updated as appropriate: allergies, current medications, past family history, past medical history, past social history, past surgical history and problem list.    Review of Systems   Constitutional: Negative for chills, fatigue and fever.   HENT: Negative for congestion, sneezing, sore throat and trouble swallowing.    Eyes: Negative for visual disturbance.   Respiratory: Negative for cough, chest tightness, shortness of breath and wheezing.    Cardiovascular: Negative for chest pain, palpitations and leg swelling.   Gastrointestinal: Negative for abdominal pain, constipation, diarrhea, nausea and vomiting.   Genitourinary: Positive for frequency. Negative for dysuria and urgency.   Musculoskeletal: Negative for neck pain.   Skin: Negative for rash.   Neurological: Negative for dizziness, weakness and headaches.   Psychiatric/Behavioral:        Patient denies any feelings of depression and has not felt down, hopeless or lost interest in any activities.   All other systems reviewed and are negative.  Review of systems has been reviewed and validated on 06/11/2021 and updated with any changes.    Objective  "  Vitals:    06/11/21 1451   BP: 116/78   Pulse: 66   SpO2: 97%   Weight: 65.8 kg (145 lb)   Height: 170.2 cm (67\")   PainSc: 0-No pain      Body mass index is 22.71 kg/m².    Physical Exam   Constitutional: He is oriented to person, place, and time. He appears well-developed. No distress.   HENT:   Head: Normocephalic and atraumatic.   Nose: Nose normal.   Eyes: Pupils are equal, round, and reactive to light. Conjunctivae are normal. No scleral icterus.   Cardiovascular: Normal rate, regular rhythm and normal heart sounds. Exam reveals no gallop and no friction rub.   No murmur heard.  Pulmonary/Chest: Effort normal and breath sounds normal. No respiratory distress. He has no wheezes. He has no rales.   Musculoskeletal: Normal range of motion.   Lymphadenopathy:     He has no cervical adenopathy.   Neurological: He is alert and oriented to person, place, and time. No cranial nerve deficit.   Skin: Skin is warm and dry. No rash noted.   Psychiatric: His behavior is normal. Judgment and thought content normal.   Nursing note and vitals reviewed.  Physical exam has been reviewed and validated on 06/11/2021 and updated with any changes.    Assessment/Plan   Diagnoses and all orders for this visit:    1. Type 2 diabetes mellitus with hyperglycemia, without long-term current use of insulin (CMS/Former[facility]) (Primary)  -     Hemoglobin A1c; Future    2. Essential hypertension  -     CBC & Differential; Future  -     Comprehensive Metabolic Panel; Future  -     T4, Free; Future  -     TSH; Future  -     Urinalysis With Culture If Indicated -; Future  -     losartan (COZAAR) 50 MG tablet; Take 1 tablet by mouth Daily.  Dispense: 90 tablet; Refill: 1    3. Mixed hyperlipidemia  -     LDL Cholesterol, Direct; Future  -     atorvastatin (LIPITOR) 20 MG tablet; Take 0.5 tablets by mouth Every Night.  Dispense: 45 tablet; Refill: 1    4. Gastroesophageal reflux disease without esophagitis    5. Elevated PSA  -     PSA DIAGNOSTIC; " Future  -     tamsulosin (FLOMAX) 0.4 MG capsule 24 hr capsule; Take 1 capsule by mouth Daily.  Dispense: 90 capsule; Refill: 3    6. Allergy to alpha-gal    7. Benign prostatic hyperplasia with urinary frequency  -     tamsulosin (FLOMAX) 0.4 MG capsule 24 hr capsule; Take 1 capsule by mouth Daily.  Dispense: 90 capsule; Refill: 3         Labs are reviewed with patient.  His glucose is 131.  His hemoglobin A1c is 6.40.  His diabetes is well controlled.  He will continue with his current diabetic medication.  He may monitor blood sugar 1 time daily.  His total cholesterol is 130, LDL 64 and triglycerides 103.  He will continue with Lipitor 20 mg daily for treatment of hyperlipidemia. He will continue with Pepcid for treatment of GERD.  His blood pressure is well controlled, and he will continue with his current blood pressure medication.  He will follow-up with his allergist as scheduled for ongoing management of the alpha gal allergy.  His PSA is elevated at 4.290.  This is decreased slightly from the previous level of 4.44 on 6/1/2020.  I have again discussed referral to urology versus repeating a PSA in 6 months.  He wishes to hold off on urology referral at this time.  I will start Flomax 0.4 mg daily for BPH.  This should also help with the PSA level.    ACP Discussion Status: ACP discussion was held with the patient during this visit. Patient does not have an advance directive, declines further assistance.      PHQ-2/PHQ-9 Depression Screening 6/11/2021   Little interest or pleasure in doing things 0   Feeling down, depressed, or hopeless 0   Trouble falling or staying asleep, or sleeping too much -   Feeling tired or having little energy -   Poor appetite or overeating -   Feeling bad about yourself - or that you are a failure or have let yourself or your family down -   Trouble concentrating on things, such as reading the newspaper or watching television -   Moving or speaking so slowly that other people  could have noticed. Or the opposite - being so fidgety or restless that you have been moving around a lot more than usual -   Thoughts that you would be better off dead, or of hurting yourself in some way -   Total Score 0         Lab on 06/04/2021   Component Date Value Ref Range Status   • Glucose 06/04/2021 131* 70 - 99 mg/dL Final   • BUN 06/04/2021 18  7 - 23 mg/dL Final   • Creatinine 06/04/2021 0.96  0.70 - 1.30 mg/dL Final   • Sodium 06/04/2021 138  137 - 145 mmol/L Final   • Potassium 06/04/2021 4.0  3.4 - 5.0 mmol/L Final   • Chloride 06/04/2021 101  101 - 112 mmol/L Final   • CO2 06/04/2021 31.0* 22.0 - 30.0 mmol/L Final   • Calcium 06/04/2021 9.0  8.4 - 10.2 mg/dL Final   • Total Protein 06/04/2021 6.6  6.3 - 8.6 g/dL Final   • Albumin 06/04/2021 3.90  3.50 - 5.00 g/dL Final   • ALT (SGPT) 06/04/2021 18  <=50 U/L Final   • AST (SGOT) 06/04/2021 23  17 - 59 U/L Final   • Alkaline Phosphatase 06/04/2021 88  38 - 126 U/L Final   • Total Bilirubin 06/04/2021 0.7  0.2 - 1.3 mg/dL Final   • eGFR Non African Amer 06/04/2021 78  49 - 113 mL/min/1.73 Final   • Globulin 06/04/2021 2.7  2.3 - 3.5 gm/dL Final   • A/G Ratio 06/04/2021 1.4  1.1 - 1.8 g/dL Final   • BUN/Creatinine Ratio 06/04/2021 18.8  7.0 - 25.0 Final   • Anion Gap 06/04/2021 6.0  5.0 - 15.0 mmol/L Final   • Free T4 06/04/2021 1.32  0.93 - 1.70 ng/dL Final   • TSH 06/04/2021 2.990  0.270 - 4.200 uIU/mL Final   • Color, UA 06/04/2021 Dark Yellow* Yellow, Straw Final   • Appearance, UA 06/04/2021 Clear  Clear Final   • pH, UA 06/04/2021 5.5  5.5 - 8.0 Final   • Specific Gravity, UA 06/04/2021 >=1.030  1.005 - 1.030 Final   • Glucose, UA 06/04/2021 100 mg/dL (Trace)* Negative Final   • Ketones, UA 06/04/2021 Negative  Negative Final   • Bilirubin, UA 06/04/2021 Negative  Negative Final   • Blood, UA 06/04/2021 Negative  Negative Final   • Protein, UA 06/04/2021 Negative  Negative Final   • Leuk Esterase, UA 06/04/2021 Negative  Negative Final   •  Nitrite, UA 06/04/2021 Negative  Negative Final   • Urobilinogen, UA 06/04/2021 0.2 E.U./dL  0.2 - 1.0 E.U./dL Final   • Hemoglobin A1C 06/04/2021 6.40* 4.80 - 5.60 % Final   • Total Cholesterol 06/04/2021 130* 150 - 200 mg/dL Final   • Triglycerides 06/04/2021 103  <=150 mg/dL Final   • HDL Cholesterol 06/04/2021 47  40 - 59 mg/dL Final   • LDL Cholesterol  06/04/2021 64  <=100 mg/dL Final   • VLDL Cholesterol 06/04/2021 19  5 - 40 mg/dL Final   • LDL/HDL Ratio 06/04/2021 1.33  0.00 - 3.55 Final   • Microalbumin/Creatinine Ratio 06/04/2021    Final    Unable to calculate   • Creatinine, Urine 06/04/2021 173.2  mg/dL Final   • Microalbumin, Urine 06/04/2021 <1.2  mg/dL Final   • PSA 06/04/2021 4.290* 0.000 - 4.000 ng/mL Final   • WBC 06/04/2021 6.22  3.40 - 10.80 10*3/mm3 Final   • RBC 06/04/2021 5.37  4.14 - 5.80 10*6/mm3 Final   • Hemoglobin 06/04/2021 16.7  13.0 - 17.7 g/dL Final   • Hematocrit 06/04/2021 46.6  37.5 - 51.0 % Final   • MCV 06/04/2021 86.8  79.0 - 97.0 fL Final   • MCH 06/04/2021 31.1  26.6 - 33.0 pg Final   • MCHC 06/04/2021 35.8* 31.5 - 35.7 g/dL Final   • RDW 06/04/2021 12.4  12.3 - 15.4 % Final   • RDW-SD 06/04/2021 38.9  37.0 - 54.0 fl Final   • MPV 06/04/2021 10.0  6.0 - 12.0 fL Final   • Platelets 06/04/2021 175  140 - 450 10*3/mm3 Final   • Neutrophil % 06/04/2021 55.0  42.7 - 76.0 % Final   • Lymphocyte % 06/04/2021 29.0  19.6 - 45.3 % Final   • Monocyte % 06/04/2021 8.0  5.0 - 12.0 % Final   • Eosinophil % 06/04/2021 8.0* 0.3 - 6.2 % Final   • Neutrophils Absolute 06/04/2021 3.42  1.70 - 7.00 10*3/mm3 Final   • Lymphocytes Absolute 06/04/2021 1.80  0.70 - 3.10 10*3/mm3 Final   • Monocytes Absolute 06/04/2021 0.50  0.10 - 0.90 10*3/mm3 Final   • Eosinophils Absolute 06/04/2021 0.50* 0.00 - 0.40 10*3/mm3 Final   • RBC Morphology 06/04/2021 Normal  Normal Final   • WBC Morphology 06/04/2021 Normal  Normal Final   • Platelet Estimate 06/04/2021 Adequate  Normal Final   ]        .  .

## 2021-06-11 ENCOUNTER — OFFICE VISIT (OUTPATIENT)
Dept: FAMILY MEDICINE CLINIC | Facility: CLINIC | Age: 66
End: 2021-06-11

## 2021-06-11 VITALS
WEIGHT: 145 LBS | DIASTOLIC BLOOD PRESSURE: 78 MMHG | SYSTOLIC BLOOD PRESSURE: 116 MMHG | HEART RATE: 66 BPM | OXYGEN SATURATION: 97 % | HEIGHT: 67 IN | BODY MASS INDEX: 22.76 KG/M2

## 2021-06-11 DIAGNOSIS — I10 ESSENTIAL HYPERTENSION: Chronic | ICD-10-CM

## 2021-06-11 DIAGNOSIS — E11.65 TYPE 2 DIABETES MELLITUS WITH HYPERGLYCEMIA, WITHOUT LONG-TERM CURRENT USE OF INSULIN (HCC): Primary | Chronic | ICD-10-CM

## 2021-06-11 DIAGNOSIS — R35.0 BENIGN PROSTATIC HYPERPLASIA WITH URINARY FREQUENCY: ICD-10-CM

## 2021-06-11 DIAGNOSIS — Z91.018 ALLERGY TO ALPHA-GAL: Chronic | ICD-10-CM

## 2021-06-11 DIAGNOSIS — N40.1 BENIGN PROSTATIC HYPERPLASIA WITH URINARY FREQUENCY: ICD-10-CM

## 2021-06-11 DIAGNOSIS — K21.9 GASTROESOPHAGEAL REFLUX DISEASE WITHOUT ESOPHAGITIS: Chronic | ICD-10-CM

## 2021-06-11 DIAGNOSIS — E78.2 MIXED HYPERLIPIDEMIA: Chronic | ICD-10-CM

## 2021-06-11 DIAGNOSIS — R97.20 ELEVATED PSA: Chronic | ICD-10-CM

## 2021-06-11 PROCEDURE — 99214 OFFICE O/P EST MOD 30 MIN: CPT | Performed by: INTERNAL MEDICINE

## 2021-06-11 RX ORDER — TAMSULOSIN HYDROCHLORIDE 0.4 MG/1
1 CAPSULE ORAL DAILY
Qty: 90 CAPSULE | Refills: 3 | Status: SHIPPED | OUTPATIENT
Start: 2021-06-11 | End: 2022-06-10

## 2021-06-11 RX ORDER — LOSARTAN POTASSIUM 50 MG/1
50 TABLET ORAL DAILY
Qty: 90 TABLET | Refills: 1 | Status: SHIPPED | OUTPATIENT
Start: 2021-06-11 | End: 2021-09-10

## 2021-06-11 RX ORDER — ATORVASTATIN CALCIUM 20 MG/1
10 TABLET, FILM COATED ORAL NIGHTLY
Qty: 45 TABLET | Refills: 1 | Status: SHIPPED | OUTPATIENT
Start: 2021-06-11 | End: 2021-12-10 | Stop reason: SDUPTHER

## 2021-08-13 DIAGNOSIS — E11.65 TYPE 2 DIABETES MELLITUS WITH HYPERGLYCEMIA, WITHOUT LONG-TERM CURRENT USE OF INSULIN (HCC): Chronic | ICD-10-CM

## 2021-08-16 RX ORDER — LINAGLIPTIN 5 MG/1
TABLET, FILM COATED ORAL
Qty: 30 TABLET | Refills: 5 | Status: SHIPPED | OUTPATIENT
Start: 2021-08-16 | End: 2022-01-13

## 2021-09-10 DIAGNOSIS — I10 ESSENTIAL HYPERTENSION: Chronic | ICD-10-CM

## 2021-09-10 RX ORDER — LOSARTAN POTASSIUM 50 MG/1
50 TABLET ORAL DAILY
Qty: 90 TABLET | Refills: 1 | Status: SHIPPED | OUTPATIENT
Start: 2021-09-10 | End: 2022-06-10

## 2021-10-19 DIAGNOSIS — Z12.5 SCREENING FOR PROSTATE CANCER: Primary | ICD-10-CM

## 2021-10-19 DIAGNOSIS — N42.9 DISORDER OF PROSTATE, UNSPECIFIED: ICD-10-CM

## 2021-10-19 DIAGNOSIS — E78.2 MIXED HYPERLIPIDEMIA: ICD-10-CM

## 2021-10-19 DIAGNOSIS — E11.65 TYPE 2 DIABETES MELLITUS WITH HYPERGLYCEMIA, WITHOUT LONG-TERM CURRENT USE OF INSULIN (HCC): ICD-10-CM

## 2021-10-19 DIAGNOSIS — I10 ESSENTIAL HYPERTENSION: ICD-10-CM

## 2021-12-09 NOTE — PROGRESS NOTES
QUICK REFERENCE INFORMATION:  The ABCs of the Annual Wellness Visit    Subsequent Medicare Wellness Visit    HEALTH RISK ASSESSMENT    : 1955    Recent Hospitalizations:  No hospitalization(s) within the last year..        Current Medical Providers:  Patient Care Team:  Kaiser Hassan MD as PCP - General (Family Medicine)  Mannie Collins DPM as Consulting Physician (Podiatry)  Dusty Anton OD as Consulting Physician (Optometry)        Smoking Status:  Social History     Tobacco Use   Smoking Status Never Smoker   Smokeless Tobacco Never Used       Alcohol Consumption:  Social History     Substance and Sexual Activity   Alcohol Use No       Depression Screen:   PHQ-2/PHQ-9 Depression Screening 12/10/2021   Little interest or pleasure in doing things 0   Feeling down, depressed, or hopeless 0   Trouble falling or staying asleep, or sleeping too much -   Feeling tired or having little energy -   Poor appetite or overeating -   Feeling bad about yourself - or that you are a failure or have let yourself or your family down -   Trouble concentrating on things, such as reading the newspaper or watching television -   Moving or speaking so slowly that other people could have noticed. Or the opposite - being so fidgety or restless that you have been moving around a lot more than usual -   Thoughts that you would be better off dead, or of hurting yourself in some way -   Total Score 0       Health Habits and Functional and Cognitive Screening:  Functional & Cognitive Status 12/10/2021   Do you have difficulty preparing food and eating? No   Do you have difficulty bathing yourself, getting dressed or grooming yourself? No   Do you have difficulty using the toilet? No   Do you have difficulty moving around from place to place? No   Do you have trouble with steps or getting out of a bed or a chair? No   Current Diet Diabetic Diet   Dental Exam Not up to date   Eye Exam Not up to date   Exercise (times per  week) 7 times per week   Current Exercises Include Bicycling Outdoors;Walking   Current Exercise Activities Include -   Do you need help using the phone?  No   Are you deaf or do you have serious difficulty hearing?  No   Do you need help with transportation? No   Do you need help shopping? No   Do you need help preparing meals?  No   Do you need help with housework?  No   Do you need help with laundry? No   Do you need help taking your medications? No   Do you need help managing money? No   Do you ever drive or ride in a car without wearing a seat belt? No   Have you felt unusual stress, anger or loneliness in the last month? No   Who do you live with? Spouse   If you need help, do you have trouble finding someone available to you? No   Have you been bothered in the last four weeks by sexual problems? No   Do you have difficulty concentrating, remembering or making decisions? No           Does the patient have evidence of cognitive impairment? No    Asiprin use counseling: Does not take ASA      Recent Lab Results:    Lab Results   Component Value Date     (H) 11/01/2021     Lab Results   Component Value Date    HGBA1C 6.5 (H) 12/03/2021     Lab Results   Component Value Date    CHOL 130 (L) 06/04/2021    TRIG 103 06/04/2021    HDL 47 06/04/2021    VLDL 19 06/04/2021    LDLHDL 1.33 06/04/2021           Age-appropriate Screening Schedule:  Refer to the list below for future screening recommendations based on patient's age, sex and/or medical conditions. Orders for these recommended tests are listed in the plan section. The patient has been provided with a written plan.    Health Maintenance   Topic Date Due   • ZOSTER VACCINE (1 of 2) Never done   • DIABETIC FOOT EXAM  11/28/2018   • DIABETIC EYE EXAM  10/18/2019   • INFLUENZA VACCINE  12/10/2022 (Originally 8/1/2021)   • HEMOGLOBIN A1C  06/03/2022   • LIPID PANEL  06/04/2022   • URINE MICROALBUMIN  06/04/2022   • PROSTATE CANCER SCREENING  12/03/2022   •  TDAP/TD VACCINES (2 - Td or Tdap) 11/06/2024        Subjective   History of Present Illness    Lui Miner is a 66 y.o. male who presents for an Annual Wellness Visit.    The following portions of the patient's history were reviewed and updated as appropriate: allergies, current medications, past family history, past medical history, past social history, past surgical history and problem list.    Outpatient Medications Prior to Visit   Medication Sig Dispense Refill   • Blood Glucose Monitoring Suppl (ACURA BLOOD GLUCOSE METER) W/DEVICE kit to test FSBS 1 time per day as directed.DX:DM/E11.9, #1 glucometer, #50 strips, #100 lancets     • losartan (COZAAR) 50 MG tablet TAKE 1 TABLET BY MOUTH DAILY. 90 tablet 1   • tamsulosin (FLOMAX) 0.4 MG capsule 24 hr capsule Take 1 capsule by mouth Daily. 90 capsule 3   • Tradjenta 5 MG tablet tablet TAKE 1 TABLET BY MOUTH DAILY. 30 tablet 5   • atorvastatin (LIPITOR) 20 MG tablet Take 0.5 tablets by mouth Every Night. 45 tablet 1   • glimepiride (AMARYL) 1 MG tablet Take 1 tablet by mouth Daily. 90 tablet 3   • glucose blood test strip Use to check fsbs daily 50 each 11   • Lancets misc Use to check fsbs daily 50 each 11   • famotidine (PEPCID) 20 MG tablet Take 1 tablet by mouth 2 (Two) Times a Day. 30 tablet 0   • fexofenadine (ALLEGRA) 180 MG tablet Take 1 tablet by mouth Daily. 90 tablet 0     No facility-administered medications prior to visit.       Patient Active Problem List   Diagnosis   • Type 2 diabetes mellitus with hyperglycemia, without long-term current use of insulin ([facility])   • Mixed hyperlipidemia   • Essential hypertension   • Chronic urticaria   • Elevated PSA   • Gastroesophageal reflux disease without esophagitis   • Allergy to alpha-gal       Advance Care Planning:  ACP Discussion Status: ACP discussion was held with the patient during this visit. Patient does not have an advance directive, declines further assistance.      Identification of Risk  "Factors:  Risk factors include: Diabetes, Hypertension.    Review of Systems    Compared to one year ago, the patient feels his physical health is the same.  Compared to one year ago, the patient feels his mental health is the same.    Objective     Physical Exam    Vitals:    12/10/21 0821   BP: 126/68   BP Location: Left arm   Patient Position: Sitting   Cuff Size: Adult   Pulse: 72   Temp: 96.8 °F (36 °C)   TempSrc: Tympanic   SpO2: 96%   Weight: 64.9 kg (143 lb)   Height: 170.2 cm (67\")   PainSc: 0-No pain       Patient's Body mass index is 22.4 kg/m². BMI is within normal parameters. No follow-up required..      Assessment/Plan   Patient Self-Management and Personalized Health Advice  The patient has been provided with information about: diet and exercise and preventive services including:   · Exercise counseling provided, Fall Risk assessment done, Nutrition counseling provided, Pneumococcal vaccine , Prostate cancer screening discussed.    · Recommend annual influenza vaccine.    · I have reminded him of the importance of an annual diabetic eye exam.    · Recommend Shingrix vaccine for shingles.  · Recommend COVID-19 vaccine    Visit Diagnoses:    ICD-10-CM ICD-9-CM   1. Medicare annual wellness visit, subsequent  Z00.00 V70.0   2. Type 2 diabetes mellitus with hyperglycemia, without long-term current use of insulin (HCC)  E11.65 250.00     790.29   3. Essential hypertension  I10 401.9   4. Mixed hyperlipidemia  E78.2 272.2   5. Gastroesophageal reflux disease without esophagitis  K21.9 530.81   6. Elevated PSA  R97.20 790.93   7. Allergy to alpha-gal  Z91.018 V15.05       Orders Placed This Encounter   Procedures   • Comprehensive Metabolic Panel     Standing Status:   Future     Standing Expiration Date:   12/9/2022     Order Specific Question:   Release to patient     Answer:   Immediate   • T4, Free     Standing Status:   Future     Standing Expiration Date:   12/9/2022     Order Specific Question:   " Release to patient     Answer:   Immediate   • TSH     Standing Status:   Future     Standing Expiration Date:   12/9/2022     Order Specific Question:   Release to patient     Answer:   Immediate   • Urinalysis With Culture If Indicated -     Standing Status:   Future     Standing Expiration Date:   12/9/2022     Order Specific Question:   Release to patient     Answer:   Immediate   • Hemoglobin A1c     Standing Status:   Future     Standing Expiration Date:   12/9/2022     Order Specific Question:   Release to patient     Answer:   Immediate   • Lipid Panel     Standing Status:   Future     Standing Expiration Date:   12/9/2022   • PSA DIAGNOSTIC     Standing Status:   Future     Standing Expiration Date:   12/9/2022     Order Specific Question:   Release to patient     Answer:   Immediate   • Microalbumin / Creatinine Urine Ratio - Urine, Clean Catch     Standing Status:   Future     Standing Expiration Date:   12/9/2022     Order Specific Question:   Release to patient     Answer:   Immediate   • Ambulatory Referral to Urology     Referral Priority:   Routine     Referral Type:   Consultation     Referral Reason:   Specialty Services Required     Referred to Provider:   Ko Montero MD     Requested Specialty:   Urology     Number of Visits Requested:   1   • CBC & Differential     Standing Status:   Future     Standing Expiration Date:   12/9/2022     Order Specific Question:   Manual Differential     Answer:   No       Outpatient Encounter Medications as of 12/10/2021   Medication Sig Dispense Refill   • atorvastatin (LIPITOR) 20 MG tablet Take 0.5 tablets by mouth Every Night. 45 tablet 3   • Blood Glucose Monitoring Suppl (ACURA BLOOD GLUCOSE METER) W/DEVICE kit to test FSBS 1 time per day as directed.DX:DM/E11.9, #1 glucometer, #50 strips, #100 lancets     • glimepiride (AMARYL) 1 MG tablet Take 1 tablet by mouth Daily. 90 tablet 3   • glucose blood test strip Use to check fsbs daily 50 each 11   •  Lancets misc Use to check fsbs daily 50 each 11   • losartan (COZAAR) 50 MG tablet TAKE 1 TABLET BY MOUTH DAILY. 90 tablet 1   • tamsulosin (FLOMAX) 0.4 MG capsule 24 hr capsule Take 1 capsule by mouth Daily. 90 capsule 3   • Tradjenta 5 MG tablet tablet TAKE 1 TABLET BY MOUTH DAILY. 30 tablet 5   • [DISCONTINUED] atorvastatin (LIPITOR) 20 MG tablet Take 0.5 tablets by mouth Every Night. 45 tablet 1   • [DISCONTINUED] glimepiride (AMARYL) 1 MG tablet Take 1 tablet by mouth Daily. 90 tablet 3   • [DISCONTINUED] glucose blood test strip Use to check fsbs daily 50 each 11   • [DISCONTINUED] Lancets misc Use to check fsbs daily 50 each 11   • famotidine (PEPCID) 20 MG tablet Take 1 tablet by mouth 2 (Two) Times a Day. 30 tablet 0   • fexofenadine (ALLEGRA) 180 MG tablet Take 1 tablet by mouth Daily. 90 tablet 0     No facility-administered encounter medications on file as of 12/10/2021.       Reviewed use of high risk medication in the elderly: yes  Reviewed for potential of harmful drug interactions in the elderly: yes    Follow Up:  Return in about 6 months (around 6/10/2022) for Next scheduled follow up, Or sooner as needed With Labs prior to appointment.     An After Visit Summary and PPPS with all of these plans were given to the patient.

## 2021-12-09 NOTE — PROGRESS NOTES
Subjective   Lui Miner is a 66 y.o. male who presents to the office for follow-up and review of labs.  His labs were done at [facility].  He has diabetes and his blood sugar has been doing well. He takes Tradjenta 5 mg daily and glimepiride 1 mg daily.  He is tolerating these without any side effects.  He has hypertension and his blood pressure has been controlled.  He takes losartan 50 mg daily.  He has hyperlipidemia and takes Lipitor 20 mg daily.  He takes Pepcid for treatment of GERD.  He has alpha gal and follows with an allergy specialist.  He also has a diagnosis of an elevated PSA.  I have referred him to see urology, but he did not keep the appointment.  He occasionally has episodes of urinary frequency.  Last month, he saw urology due to a kidney stone, but did not mention the elevated PSA.    History of Present Illness has been reviewed and validated on 12/10/2021 and updated with any changes.    The following portions of the patient's history were reviewed and updated as appropriate: allergies, current medications, past family history, past medical history, past social history, past surgical history and problem list.    Review of Systems   Constitutional: Negative for chills, fatigue and fever.   HENT: Negative for congestion, sneezing, sore throat and trouble swallowing.    Eyes: Negative for visual disturbance.   Respiratory: Negative for cough, chest tightness, shortness of breath and wheezing.    Cardiovascular: Negative for chest pain, palpitations and leg swelling.   Gastrointestinal: Negative for abdominal pain, constipation, diarrhea, nausea and vomiting.   Genitourinary: Positive for frequency. Negative for dysuria and urgency.   Musculoskeletal: Negative for neck pain.   Skin: Negative for rash.   Neurological: Negative for dizziness, weakness and headaches.   Psychiatric/Behavioral:        Patient denies any feelings of depression and has not felt  "down, hopeless or lost interest in any activities.   All other systems reviewed and are negative.  Review of systems has been reviewed and validated on 12/10/2021 and updated with any changes.    Objective   Vitals:    12/10/21 0821   BP: 126/68   BP Location: Left arm   Patient Position: Sitting   Cuff Size: Adult   Pulse: 72  Comment: regular   Temp: 96.8 °F (36 °C)   TempSrc: Tympanic   SpO2: 96%   Weight: 64.9 kg (143 lb)   Height: 170.2 cm (67\")   PainSc: 0-No pain      Body mass index is 22.4 kg/m².    Physical Exam   Constitutional: He is oriented to person, place, and time. He appears well-developed. No distress.   HENT:   Head: Normocephalic and atraumatic.   Nose: Nose normal.   Eyes: Pupils are equal, round, and reactive to light. Conjunctivae are normal. No scleral icterus.   Cardiovascular: Normal rate, regular rhythm and normal heart sounds. Exam reveals no gallop and no friction rub.   No murmur heard.  Pulmonary/Chest: Effort normal and breath sounds normal. No respiratory distress. He has no wheezes. He has no rales.   Musculoskeletal: Normal range of motion.   Lymphadenopathy:     He has no cervical adenopathy.   Neurological: He is alert and oriented to person, place, and time. No cranial nerve deficit.   Skin: Skin is warm and dry. No rash noted.   Psychiatric: His behavior is normal. Judgment and thought content normal.   Nursing note and vitals reviewed.  Physical exam has been reviewed and validated on 12/10/2021 and updated with any changes.    Assessment/Plan   Diagnoses and all orders for this visit:    1. Medicare annual wellness visit, subsequent ([facility])    2. Type 2 diabetes mellitus with hyperglycemia, without long-term current use of insulin (HCC)  -     Hemoglobin A1c; Future  -     Microalbumin / Creatinine Urine Ratio - Urine, Clean Catch; Future  -     glimepiride (AMARYL) 1 MG tablet; Take 1 tablet by mouth Daily.  Dispense: 90 tablet; Refill: 3  -     glucose blood test strip; " Use to check fsbs daily  Dispense: 50 each; Refill: 11  -     Lancets misc; Use to check fsbs daily  Dispense: 50 each; Refill: 11    3. Essential hypertension  -     CBC & Differential; Future  -     Comprehensive Metabolic Panel; Future  -     T4, Free; Future  -     TSH; Future  -     Urinalysis With Culture If Indicated -; Future    4. Mixed hyperlipidemia  -     Lipid Panel; Future  -     atorvastatin (LIPITOR) 20 MG tablet; Take 0.5 tablets by mouth Every Night.  Dispense: 45 tablet; Refill: 3    5. Gastroesophageal reflux disease without esophagitis    6. Elevated PSA  -     PSA DIAGNOSTIC; Future  -     Ambulatory Referral to Urology    7. Allergy to alpha-gal         Labs are reviewed with patient.  His glucose is 142.  His hemoglobin A1c is 6.50.  His diabetes is well controlled.  He will continue with his current diabetic medication.  He may monitor blood sugar 1 time daily. He will continue with Lipitor 20 mg daily for treatment of hyperlipidemia. He will continue with Pepcid for treatment of GERD.  His blood pressure is well controlled, and he will continue with his current blood pressure medication.  He will follow-up with his allergist as scheduled for ongoing management of the alpha gal allergy.  His PSA is elevated at 6.4.  This is up from the previous level of 4.290.  I have again discussed the importance of referral to urology for further evaluation of the PSA elevation.  Since he saw Dr. Montero in [place] last month for the kidney stone, I will submit a new referral to the same physician for evaluation of the PSA elevation.  He will continue Flomax 0.4 mg daily for treatment of BPH symptoms.    Patient does not want a flu shot today.    PHQ-2/PHQ-9 Depression Screening 12/10/2021   Little interest or pleasure in doing things 0   Feeling down, depressed, or hopeless 0   Trouble falling or staying asleep, or sleeping too much -   Feeling tired or having little energy -   Poor appetite or  overeating -   Feeling bad about yourself - or that you are a failure or have let yourself or your family down -   Trouble concentrating on things, such as reading the newspaper or watching television -   Moving or speaking so slowly that other people could have noticed. Or the opposite - being so fidgety or restless that you have been moving around a lot more than usual -   Thoughts that you would be better off dead, or of hurting yourself in some way -   Total Score 0         No visits with results within 2 Month(s) from this visit.   Latest known visit with results is:   Lab on 06/04/2021   Component Date Value Ref Range Status   • Glucose 06/04/2021 131* 70 - 99 mg/dL Final   • BUN 06/04/2021 18  7 - 23 mg/dL Final   • Creatinine 06/04/2021 0.96  0.70 - 1.30 mg/dL Final   • Sodium 06/04/2021 138  137 - 145 mmol/L Final   • Potassium 06/04/2021 4.0  3.4 - 5.0 mmol/L Final   • Chloride 06/04/2021 101  101 - 112 mmol/L Final   • CO2 06/04/2021 31.0* 22.0 - 30.0 mmol/L Final   • Calcium 06/04/2021 9.0  8.4 - 10.2 mg/dL Final   • Total Protein 06/04/2021 6.6  6.3 - 8.6 g/dL Final   • Albumin 06/04/2021 3.90  3.50 - 5.00 g/dL Final   • ALT (SGPT) 06/04/2021 18  <=50 U/L Final   • AST (SGOT) 06/04/2021 23  17 - 59 U/L Final   • Alkaline Phosphatase 06/04/2021 88  38 - 126 U/L Final   • Total Bilirubin 06/04/2021 0.7  0.2 - 1.3 mg/dL Final   • eGFR Non African Amer 06/04/2021 78  49 - 113 mL/min/1.73 Final   • Globulin 06/04/2021 2.7  2.3 - 3.5 gm/dL Final   • A/G Ratio 06/04/2021 1.4  1.1 - 1.8 g/dL Final   • BUN/Creatinine Ratio 06/04/2021 18.8  7.0 - 25.0 Final   • Anion Gap 06/04/2021 6.0  5.0 - 15.0 mmol/L Final   • Free T4 06/04/2021 1.32  0.93 - 1.70 ng/dL Final   • TSH 06/04/2021 2.990  0.270 - 4.200 uIU/mL Final   • Color, UA 06/04/2021 Dark Yellow* Yellow, Straw Final   • Appearance, UA 06/04/2021 Clear  Clear Final   • pH, UA 06/04/2021 5.5  5.5 - 8.0 Final   • Specific Gravity, UA 06/04/2021 >=1.030  1.005 -  1.030 Final   • Glucose, UA 06/04/2021 100 mg/dL (Trace)* Negative Final   • Ketones, UA 06/04/2021 Negative  Negative Final   • Bilirubin, UA 06/04/2021 Negative  Negative Final   • Blood, UA 06/04/2021 Negative  Negative Final   • Protein, UA 06/04/2021 Negative  Negative Final   • Leuk Esterase, UA 06/04/2021 Negative  Negative Final   • Nitrite, UA 06/04/2021 Negative  Negative Final   • Urobilinogen, UA 06/04/2021 0.2 E.U./dL  0.2 - 1.0 E.U./dL Final   • Hemoglobin A1C 06/04/2021 6.40* 4.80 - 5.60 % Final   • Total Cholesterol 06/04/2021 130* 150 - 200 mg/dL Final   • Triglycerides 06/04/2021 103  <=150 mg/dL Final   • HDL Cholesterol 06/04/2021 47  40 - 59 mg/dL Final   • LDL Cholesterol  06/04/2021 64  <=100 mg/dL Final   • VLDL Cholesterol 06/04/2021 19  5 - 40 mg/dL Final   • LDL/HDL Ratio 06/04/2021 1.33  0.00 - 3.55 Final   • Microalbumin/Creatinine Ratio 06/04/2021    Final    Unable to calculate   • Creatinine, Urine 06/04/2021 173.2  mg/dL Final   • Microalbumin, Urine 06/04/2021 <1.2  mg/dL Final   • PSA 06/04/2021 4.290* 0.000 - 4.000 ng/mL Final   • WBC 06/04/2021 6.22  3.40 - 10.80 10*3/mm3 Final   • RBC 06/04/2021 5.37  4.14 - 5.80 10*6/mm3 Final   • Hemoglobin 06/04/2021 16.7  13.0 - 17.7 g/dL Final   • Hematocrit 06/04/2021 46.6  37.5 - 51.0 % Final   • MCV 06/04/2021 86.8  79.0 - 97.0 fL Final   • MCH 06/04/2021 31.1  26.6 - 33.0 pg Final   • MCHC 06/04/2021 35.8* 31.5 - 35.7 g/dL Final   • RDW 06/04/2021 12.4  12.3 - 15.4 % Final   • RDW-SD 06/04/2021 38.9  37.0 - 54.0 fl Final   • MPV 06/04/2021 10.0  6.0 - 12.0 fL Final   • Platelets 06/04/2021 175  140 - 450 10*3/mm3 Final   • Neutrophil % 06/04/2021 55.0  42.7 - 76.0 % Final   • Lymphocyte % 06/04/2021 29.0  19.6 - 45.3 % Final   • Monocyte % 06/04/2021 8.0  5.0 - 12.0 % Final   • Eosinophil % 06/04/2021 8.0* 0.3 - 6.2 % Final   • Neutrophils Absolute 06/04/2021 3.42  1.70 - 7.00 10*3/mm3 Final   • Lymphocytes Absolute 06/04/2021 1.80   0.70 - 3.10 10*3/mm3 Final   • Monocytes Absolute 06/04/2021 0.50  0.10 - 0.90 10*3/mm3 Final   • Eosinophils Absolute 06/04/2021 0.50* 0.00 - 0.40 10*3/mm3 Final   • RBC Morphology 06/04/2021 Normal  Normal Final   • WBC Morphology 06/04/2021 Normal  Normal Final   • Platelet Estimate 06/04/2021 Adequate  Normal Final   ]        .  .  .

## 2021-12-10 ENCOUNTER — OFFICE VISIT (OUTPATIENT)
Dept: FAMILY MEDICINE CLINIC | Facility: CLINIC | Age: 66
End: 2021-12-10

## 2021-12-10 VITALS
DIASTOLIC BLOOD PRESSURE: 68 MMHG | SYSTOLIC BLOOD PRESSURE: 126 MMHG | WEIGHT: 143 LBS | TEMPERATURE: 96.8 F | HEIGHT: 67 IN | HEART RATE: 72 BPM | OXYGEN SATURATION: 96 % | BODY MASS INDEX: 22.44 KG/M2

## 2021-12-10 DIAGNOSIS — K21.9 GASTROESOPHAGEAL REFLUX DISEASE WITHOUT ESOPHAGITIS: Chronic | ICD-10-CM

## 2021-12-10 DIAGNOSIS — Z91.018 ALLERGY TO ALPHA-GAL: Chronic | ICD-10-CM

## 2021-12-10 DIAGNOSIS — Z00.00 MEDICARE ANNUAL WELLNESS VISIT, SUBSEQUENT: Primary | ICD-10-CM

## 2021-12-10 DIAGNOSIS — I10 ESSENTIAL HYPERTENSION: Chronic | ICD-10-CM

## 2021-12-10 DIAGNOSIS — R97.20 ELEVATED PSA: Chronic | ICD-10-CM

## 2021-12-10 DIAGNOSIS — E78.2 MIXED HYPERLIPIDEMIA: Chronic | ICD-10-CM

## 2021-12-10 DIAGNOSIS — E11.65 TYPE 2 DIABETES MELLITUS WITH HYPERGLYCEMIA, WITHOUT LONG-TERM CURRENT USE OF INSULIN (HCC): Chronic | ICD-10-CM

## 2021-12-10 PROCEDURE — 1159F MED LIST DOCD IN RCRD: CPT | Performed by: INTERNAL MEDICINE

## 2021-12-10 PROCEDURE — G0439 PPPS, SUBSEQ VISIT: HCPCS | Performed by: INTERNAL MEDICINE

## 2021-12-10 PROCEDURE — 1126F AMNT PAIN NOTED NONE PRSNT: CPT | Performed by: INTERNAL MEDICINE

## 2021-12-10 RX ORDER — GLIMEPIRIDE 1 MG/1
1 TABLET ORAL DAILY
Qty: 90 TABLET | Refills: 3 | Status: SHIPPED | OUTPATIENT
Start: 2021-12-10 | End: 2023-03-02

## 2021-12-10 RX ORDER — LANCETS 30 GAUGE
EACH MISCELLANEOUS
Qty: 50 EACH | Refills: 11 | Status: SHIPPED | OUTPATIENT
Start: 2021-12-10 | End: 2022-12-20

## 2021-12-10 RX ORDER — ATORVASTATIN CALCIUM 20 MG/1
10 TABLET, FILM COATED ORAL NIGHTLY
Qty: 45 TABLET | Refills: 3 | Status: SHIPPED | OUTPATIENT
Start: 2021-12-10 | End: 2022-06-10 | Stop reason: SDUPTHER

## 2021-12-14 ENCOUNTER — TELEPHONE (OUTPATIENT)
Dept: FAMILY MEDICINE CLINIC | Facility: CLINIC | Age: 66
End: 2021-12-14

## 2021-12-14 NOTE — TELEPHONE ENCOUNTER
I called Dr Anton's office and requested they fax the last diabetic eye exam for the patient to our office.

## 2021-12-14 NOTE — TELEPHONE ENCOUNTER
----- Message from Kaiser Hassan MD sent at 12/10/2021  8:39 AM CST -----  Regarding: Diabetic Eye Exam   Need diabetic eye exam copy from Dr. Anton (was seen last year)

## 2022-01-13 DIAGNOSIS — E11.65 TYPE 2 DIABETES MELLITUS WITH HYPERGLYCEMIA, WITHOUT LONG-TERM CURRENT USE OF INSULIN: Chronic | ICD-10-CM

## 2022-01-13 RX ORDER — LINAGLIPTIN 5 MG/1
TABLET, FILM COATED ORAL
Qty: 30 TABLET | Refills: 5 | Status: SHIPPED | OUTPATIENT
Start: 2022-01-13 | End: 2022-07-18

## 2022-06-09 DIAGNOSIS — R35.0 BENIGN PROSTATIC HYPERPLASIA WITH URINARY FREQUENCY: ICD-10-CM

## 2022-06-09 DIAGNOSIS — N40.1 BENIGN PROSTATIC HYPERPLASIA WITH URINARY FREQUENCY: ICD-10-CM

## 2022-06-09 DIAGNOSIS — I10 ESSENTIAL HYPERTENSION: Chronic | ICD-10-CM

## 2022-06-09 DIAGNOSIS — R97.20 ELEVATED PSA: Chronic | ICD-10-CM

## 2022-06-09 PROBLEM — C61 PROSTATE CANCER: Chronic | Status: ACTIVE | Noted: 2022-06-09

## 2022-06-09 NOTE — PROGRESS NOTES
QUICK REFERENCE INFORMATION:  The ABCs of the Annual Wellness Visit    Subsequent Medicare Wellness Visit    HEALTH RISK ASSESSMENT    : 1955    Recent Hospitalizations:  No hospitalization(s) within the last year..        Current Medical Providers:  Patient Care Team:  Kaiser Hassan MD as PCP - General (Family Medicine)  Mannie Collins DPM as Consulting Physician (Podiatry)  Dusty Anton OD as Consulting Physician (Optometry)  Ko Montero MD as Consulting Physician (Urology)        Smoking Status:  Social History     Tobacco Use   Smoking Status Never Smoker   Smokeless Tobacco Never Used       Alcohol Consumption:  Social History     Substance and Sexual Activity   Alcohol Use No       Depression Screen:   PHQ-2/PHQ-9 Depression Screening 6/10/2022   Retired PHQ-9 Total Score -   Retired Total Score -   Little Interest or Pleasure in Doing Things 0-->not at all   Feeling Down, Depressed or Hopeless 0-->not at all   PHQ-9: Brief Depression Severity Measure Score 0       Health Habits and Functional and Cognitive Screening:  Functional & Cognitive Status 6/10/2022   Do you have difficulty preparing food and eating? No   Do you have difficulty bathing yourself, getting dressed or grooming yourself? No   Do you have difficulty using the toilet? No   Do you have difficulty moving around from place to place? No   Do you have trouble with steps or getting out of a bed or a chair? No   Current Diet Well Balanced Diet   Dental Exam Not up to date   Eye Exam Up to date   Exercise (times per week) 4 times per week   Current Exercises Include Bicycling Outdoors   Current Exercise Activities Include -   Do you need help using the phone?  No   Are you deaf or do you have serious difficulty hearing?  No   Do you need help with transportation? No   Do you need help shopping? No   Do you need help preparing meals?  No   Do you need help with housework?  No   Do you need help with laundry? No   Do you need  help taking your medications? No   Do you need help managing money? No   Do you ever drive or ride in a car without wearing a seat belt? No   Have you felt unusual stress, anger or loneliness in the last month? No   Who do you live with? Spouse   If you need help, do you have trouble finding someone available to you? No   Have you been bothered in the last four weeks by sexual problems? No   Do you have difficulty concentrating, remembering or making decisions? No           Does the patient have evidence of cognitive impairment? No    Asiprin use counseling: Does not take ASA      Recent Lab Results:    Lab Results   Component Value Date     (H) 06/01/2022     Lab Results   Component Value Date    HGBA1C 6.4 (H) 06/01/2022     Lab Results   Component Value Date    CHOL 160 06/01/2022    TRIG 45 06/01/2022    HDL 66 06/01/2022    VLDL 19 06/04/2021    LDLHDL 1.33 06/04/2021           Age-appropriate Screening Schedule:  Refer to the list below for future screening recommendations based on patient's age, sex and/or medical conditions. Orders for these recommended tests are listed in the plan section. The patient has been provided with a written plan.    Health Maintenance   Topic Date Due   • ZOSTER VACCINE (1 of 2) Never done   • DIABETIC FOOT EXAM  11/28/2018   • INFLUENZA VACCINE  08/01/2022   • HEMOGLOBIN A1C  12/01/2022   • DIABETIC EYE EXAM  02/10/2023   • PROSTATE CANCER SCREENING  06/01/2023   • LIPID PANEL  06/01/2023   • URINE MICROALBUMIN  06/01/2023   • TDAP/TD VACCINES (2 - Td or Tdap) 11/06/2024        Subjective   History of Present Illness    Lui Miner is a 67 y.o. male who presents for an Annual Wellness Visit.    The following portions of the patient's history were reviewed and updated as appropriate: allergies, current medications, past family history, past medical history, past social history, past surgical history and problem list.    Outpatient Medications Prior to Visit    Medication Sig Dispense Refill   • Blood Glucose Monitoring Suppl ([employer] BLOOD GLUCOSE METER) W/DEVICE kit to test FSBS 1 time per day as directed.DX:DM/E11.9, #1 glucometer, #50 strips, #100 lancets     • glimepiride (AMARYL) 1 MG tablet Take 1 tablet by mouth Daily. 90 tablet 3   • glucose blood test strip Use to check fsbs daily 50 each 11   • Lancets misc Use to check fsbs daily 50 each 11   • losartan (COZAAR) 50 MG tablet TAKE 1 TABLET BY MOUTH DAILY. 90 tablet 3   • tamsulosin (FLOMAX) 0.4 MG capsule 24 hr capsule TAKE 1 CAPSULE BY MOUTH DAILY. 90 capsule 3   • Tradjenta 5 MG tablet tablet TAKE 1 TABLET BY MOUTH DAILY. 30 tablet 5   • atorvastatin (LIPITOR) 20 MG tablet Take 0.5 tablets by mouth Every Night. 45 tablet 3   • famotidine (PEPCID) 20 MG tablet Take 1 tablet by mouth 2 (Two) Times a Day. 30 tablet 0   • fexofenadine (ALLEGRA) 180 MG tablet Take 1 tablet by mouth Daily. 90 tablet 0     No facility-administered medications prior to visit.       Patient Active Problem List   Diagnosis   • Type 2 diabetes mellitus with hyperglycemia, without long-term current use of insulin (HCC)   • Mixed hyperlipidemia   • Essential hypertension   • Chronic urticaria   • Elevated PSA   • Gastroesophageal reflux disease without esophagitis   • Allergy to alpha-gal   • Prostate cancer (HCC)       Advance Care Planning:  ACP Discussion Status: ACP discussion was held with the patient during this visit. Patient does not have an advance directive, declines further assistance.        Identification of Risk Factors:  Risk factors include: Diabetes, Hypertension.    Review of Systems    Compared to one year ago, the patient feels his physical health is the same.  Compared to one year ago, the patient feels his mental health is the same.    Objective     Physical Exam    Vitals:    06/10/22 0841   BP: 126/70   BP Location: Left arm   Patient Position: Sitting   Cuff Size: Large Adult   Pulse: 69   Temp: 98.6 °F (37 °C)  "  TempSrc: Temporal   SpO2: 98%   Weight: 63.8 kg (140 lb 9.6 oz)   Height: 170.2 cm (67.01\")   PainSc: 0-No pain       Patient's Body mass index is 22.02 kg/m². BMI is within normal parameters. No follow-up required..      Assessment & Plan   Patient Self-Management and Personalized Health Advice  The patient has been provided with information about: diet and exercise and preventive services including:   · Exercise counseling provided, Fall Risk assessment done, Nutrition counseling provided, Pneumococcal vaccine , Prostate cancer screening discussed.    · Recommend annual influenza vaccine.    · I have reminded him of the importance of an annual diabetic eye exam.    · Recommend Shingrix vaccine for shingles.  · Recommend COVID-19 vaccine    Visit Diagnoses:    ICD-10-CM ICD-9-CM   1. Medicare annual wellness visit, subsequent  Z00.00 V70.0   2. Type 2 diabetes mellitus with hyperglycemia, without long-term current use of insulin ([facility])  E11.65 250.00     790.29   3. Essential hypertension  I10 401.9   4. Mixed hyperlipidemia  E78.2 272.2   5. Gastroesophageal reflux disease without esophagitis  K21.9 530.81   6. Allergy to alpha-gal  Z91.018 V15.05   7. Prostate cancer ([facility])  C61 185       Orders Placed This Encounter   Procedures   • Comprehensive Metabolic Panel     Standing Status:   Future     Standing Expiration Date:   6/9/2023     Order Specific Question:   Release to patient     Answer:   Immediate   • T4, Free     Standing Status:   Future     Standing Expiration Date:   6/9/2023     Order Specific Question:   Release to patient     Answer:   Immediate   • TSH     Standing Status:   Future     Standing Expiration Date:   6/9/2023     Order Specific Question:   Release to patient     Answer:   Immediate   • Urinalysis With Culture If Indicated -     Standing Status:   Future     Standing Expiration Date:   6/9/2023     Order Specific Question:   Release to patient     Answer:   Immediate   • Hemoglobin A1c "     Standing Status:   Future     Standing Expiration Date:   6/9/2023     Order Specific Question:   Release to patient     Answer:   Immediate   • LDL Cholesterol, Direct     Standing Status:   Future     Standing Expiration Date:   6/9/2023     Order Specific Question:   Release to patient     Answer:   Immediate   • CBC & Differential     Standing Status:   Future     Standing Expiration Date:   6/9/2023     Order Specific Question:   Manual Differential     Answer:   No       Outpatient Encounter Medications as of 6/10/2022   Medication Sig Dispense Refill   • atorvastatin (LIPITOR) 20 MG tablet Take 0.5 tablets by mouth Every Night. 45 tablet 3   • Blood Glucose Monitoring Suppl ([employer] BLOOD GLUCOSE METER) W/DEVICE kit to test FSBS 1 time per day as directed.DX:DM/E11.9, #1 glucometer, #50 strips, #100 lancets     • glimepiride (AMARYL) 1 MG tablet Take 1 tablet by mouth Daily. 90 tablet 3   • glucose blood test strip Use to check fsbs daily 50 each 11   • Lancets misc Use to check fsbs daily 50 each 11   • losartan (COZAAR) 50 MG tablet TAKE 1 TABLET BY MOUTH DAILY. 90 tablet 3   • tamsulosin (FLOMAX) 0.4 MG capsule 24 hr capsule TAKE 1 CAPSULE BY MOUTH DAILY. 90 capsule 3   • Tradjenta 5 MG tablet tablet TAKE 1 TABLET BY MOUTH DAILY. 30 tablet 5   • [DISCONTINUED] atorvastatin (LIPITOR) 20 MG tablet Take 0.5 tablets by mouth Every Night. 45 tablet 3   • [DISCONTINUED] famotidine (PEPCID) 20 MG tablet Take 1 tablet by mouth 2 (Two) Times a Day. 30 tablet 0   • [DISCONTINUED] fexofenadine (ALLEGRA) 180 MG tablet Take 1 tablet by mouth Daily. 90 tablet 0   • [DISCONTINUED] losartan (COZAAR) 50 MG tablet TAKE 1 TABLET BY MOUTH DAILY. 90 tablet 1   • [DISCONTINUED] tamsulosin (FLOMAX) 0.4 MG capsule 24 hr capsule Take 1 capsule by mouth Daily. 90 capsule 3     No facility-administered encounter medications on file as of 6/10/2022.       Reviewed use of high risk medication in the elderly: yes  Reviewed for  potential of harmful drug interactions in the elderly: yes    Follow Up:  Return in about 6 months (around 12/10/2022) for Next scheduled follow up, Or sooner as needed With Labs prior to appointment.     An After Visit Summary and PPPS with all of these plans were given to the patient.

## 2022-06-09 NOTE — PROGRESS NOTES
Subjective   Lui Miner is a 67 y.o. male who presents to the office for follow-up and review of labs.  His labs were done at [facility].  He has diabetes and his blood sugar has been doing well. He takes Tradjenta 5 mg daily and glimepiride 1 mg daily.  He is tolerating these without any side effects.  He has hypertension and his blood pressure has been controlled.  He takes losartan 50 mg daily.  He has hyperlipidemia and takes Lipitor 20 mg nightly.  He takes Pepcid for treatment of GERD.  He has alpha gal and follows with an allergy specialist.  He also has a diagnosis of an elevated PSA.  After multiple attempts at referral to urology, he finally agreed to see a urologist for evaluation.  He had a biopsy in March 2022 which showed the presence of malignancy.  He is scheduled to have a robotic assisted prostatectomy next month.    History of Present Illness has been reviewed and validated on 06/10/2022 and updated with any changes.    The following portions of the patient's history were reviewed and updated as appropriate: allergies, current medications, past family history, past medical history, past social history, past surgical history and problem list.    Review of Systems   Constitutional: Negative for chills, fatigue and fever.   HENT: Negative for congestion, sneezing, sore throat and trouble swallowing.    Eyes: Negative for visual disturbance.   Respiratory: Negative for cough, chest tightness, shortness of breath and wheezing.    Cardiovascular: Negative for chest pain, palpitations and leg swelling.   Gastrointestinal: Negative for abdominal pain, constipation, diarrhea, nausea and vomiting.   Genitourinary: Negative for dysuria and urgency.   Musculoskeletal: Negative for neck pain.   Skin: Negative for rash.   Neurological: Negative for dizziness, weakness and headaches.   Psychiatric/Behavioral:        Patient denies any feelings of depression and has  "not felt down, hopeless or lost interest in any activities.   All other systems reviewed and are negative.  Review of systems has been reviewed and validated on 06/10/2022 and updated with any changes.    Objective   Vitals:    06/10/22 0841   BP: 126/70   BP Location: Left arm   Patient Position: Sitting   Cuff Size: Large Adult   Pulse: 69   Temp: 98.6 °F (37 °C)   TempSrc: Temporal   SpO2: 98%   Weight: 63.8 kg (140 lb 9.6 oz)   Height: 170.2 cm (67.01\")   PainSc: 0-No pain      Body mass index is 22.02 kg/m².    Physical Exam   Constitutional: He is oriented to person, place, and time. He appears well-developed. No distress.   HENT:   Head: Normocephalic and atraumatic.   Nose: Nose normal.   Eyes: Pupils are equal, round, and reactive to light. Conjunctivae are normal. No scleral icterus.   Cardiovascular: Normal rate, regular rhythm and normal heart sounds. Exam reveals no gallop and no friction rub.   No murmur heard.  Pulmonary/Chest: Effort normal and breath sounds normal. No respiratory distress. He has no wheezes. He has no rales.   Musculoskeletal: Normal range of motion.   Lymphadenopathy:     He has no cervical adenopathy.   Neurological: He is alert and oriented to person, place, and time. No cranial nerve deficit.   Skin: Skin is warm and dry. No rash noted.   Psychiatric: His behavior is normal. Judgment and thought content normal.   Nursing note and vitals reviewed.  Physical exam has been reviewed and validated on 06/10/2022 and updated with any changes.    Assessment & Plan   Diagnoses and all orders for this visit:    1. Medicare annual wellness visit, subsequent (Primary)    2. Type 2 diabetes mellitus with hyperglycemia, without long-term current use of insulin (HCC)  -     Hemoglobin A1c; Future    3. Essential hypertension  -     CBC & Differential; Future  -     Comprehensive Metabolic Panel; Future  -     T4, Free; Future  -     TSH; Future  -     Urinalysis With Culture If Indicated -; " Future    4. Mixed hyperlipidemia  -     LDL Cholesterol, Direct; Future  -     atorvastatin (LIPITOR) 20 MG tablet; Take 0.5 tablets by mouth Every Night.  Dispense: 45 tablet; Refill: 3    5. Gastroesophageal reflux disease without esophagitis    6. Allergy to alpha-gal    7. Prostate cancer (HCC)         Labs are reviewed with patient.  His glucose is 146.  His hemoglobin A1c is 6.4.  His diabetes is well controlled.  He will continue with his current diabetic medication.  He may monitor blood sugar 1 time daily.  His total cholesterol is 160, LDL 79 and triglycerides 45.  His HDL is 66. He will continue with Lipitor 20 mg nightly for treatment of hyperlipidemia. He will continue with Pepcid for treatment of GERD.  His blood pressure is well controlled, and he will continue with his current blood pressure medication.  He will follow-up with his allergist as scheduled for ongoing management of the alpha gal allergy.  His PSA is elevated at 6.5.  This is up from the previous level of 6.4.  He will follow-up with urology as scheduled for ongoing treatment of the prostate cancer.  He will continue Flomax 0.4 mg daily for treatment of BPH symptoms.      PHQ-2/PHQ-9 Depression Screening 6/10/2022   Retired PHQ-9 Total Score -   Retired Total Score -   Little Interest or Pleasure in Doing Things 0-->not at all   Feeling Down, Depressed or Hopeless 0-->not at all   PHQ-9: Brief Depression Severity Measure Score 0

## 2022-06-10 ENCOUNTER — OFFICE VISIT (OUTPATIENT)
Dept: FAMILY MEDICINE CLINIC | Facility: CLINIC | Age: 67
End: 2022-06-10

## 2022-06-10 VITALS
WEIGHT: 140.6 LBS | TEMPERATURE: 98.6 F | SYSTOLIC BLOOD PRESSURE: 126 MMHG | HEART RATE: 69 BPM | OXYGEN SATURATION: 98 % | HEIGHT: 67 IN | DIASTOLIC BLOOD PRESSURE: 70 MMHG | BODY MASS INDEX: 22.07 KG/M2

## 2022-06-10 DIAGNOSIS — Z00.00 MEDICARE ANNUAL WELLNESS VISIT, SUBSEQUENT: Primary | ICD-10-CM

## 2022-06-10 DIAGNOSIS — Z91.018 ALLERGY TO ALPHA-GAL: Chronic | ICD-10-CM

## 2022-06-10 DIAGNOSIS — K21.9 GASTROESOPHAGEAL REFLUX DISEASE WITHOUT ESOPHAGITIS: Chronic | ICD-10-CM

## 2022-06-10 DIAGNOSIS — C61 PROSTATE CANCER: Chronic | ICD-10-CM

## 2022-06-10 DIAGNOSIS — E11.65 TYPE 2 DIABETES MELLITUS WITH HYPERGLYCEMIA, WITHOUT LONG-TERM CURRENT USE OF INSULIN: Chronic | ICD-10-CM

## 2022-06-10 DIAGNOSIS — I10 ESSENTIAL HYPERTENSION: Chronic | ICD-10-CM

## 2022-06-10 DIAGNOSIS — E78.2 MIXED HYPERLIPIDEMIA: Chronic | ICD-10-CM

## 2022-06-10 PROCEDURE — G0439 PPPS, SUBSEQ VISIT: HCPCS | Performed by: INTERNAL MEDICINE

## 2022-06-10 PROCEDURE — 1170F FXNL STATUS ASSESSED: CPT | Performed by: INTERNAL MEDICINE

## 2022-06-10 PROCEDURE — 1159F MED LIST DOCD IN RCRD: CPT | Performed by: INTERNAL MEDICINE

## 2022-06-10 PROCEDURE — 1126F AMNT PAIN NOTED NONE PRSNT: CPT | Performed by: INTERNAL MEDICINE

## 2022-06-10 RX ORDER — ATORVASTATIN CALCIUM 20 MG/1
10 TABLET, FILM COATED ORAL NIGHTLY
Qty: 45 TABLET | Refills: 3 | Status: SHIPPED | OUTPATIENT
Start: 2022-06-10

## 2022-06-10 RX ORDER — TAMSULOSIN HYDROCHLORIDE 0.4 MG/1
1 CAPSULE ORAL DAILY
Qty: 90 CAPSULE | Refills: 3 | Status: SHIPPED | OUTPATIENT
Start: 2022-06-10 | End: 2022-12-12

## 2022-06-10 RX ORDER — LOSARTAN POTASSIUM 50 MG/1
50 TABLET ORAL DAILY
Qty: 90 TABLET | Refills: 3 | Status: SHIPPED | OUTPATIENT
Start: 2022-06-10 | End: 2023-03-21 | Stop reason: SDUPTHER

## 2022-07-18 DIAGNOSIS — E11.65 TYPE 2 DIABETES MELLITUS WITH HYPERGLYCEMIA, WITHOUT LONG-TERM CURRENT USE OF INSULIN: Chronic | ICD-10-CM

## 2022-07-18 RX ORDER — LINAGLIPTIN 5 MG/1
TABLET, FILM COATED ORAL
Qty: 30 TABLET | Refills: 5 | Status: SHIPPED | OUTPATIENT
Start: 2022-07-18 | End: 2023-02-16 | Stop reason: SDUPTHER

## 2022-09-12 DIAGNOSIS — E11.65 TYPE 2 DIABETES MELLITUS WITH HYPERGLYCEMIA, WITHOUT LONG-TERM CURRENT USE OF INSULIN: ICD-10-CM

## 2022-09-12 DIAGNOSIS — I10 ESSENTIAL HYPERTENSION: ICD-10-CM

## 2022-09-12 DIAGNOSIS — E78.2 MIXED HYPERLIPIDEMIA: Primary | ICD-10-CM

## 2022-12-12 ENCOUNTER — OFFICE VISIT (OUTPATIENT)
Dept: FAMILY MEDICINE CLINIC | Facility: CLINIC | Age: 67
End: 2022-12-12

## 2022-12-12 VITALS
HEART RATE: 63 BPM | OXYGEN SATURATION: 98 % | TEMPERATURE: 97 F | DIASTOLIC BLOOD PRESSURE: 70 MMHG | HEIGHT: 67 IN | WEIGHT: 143.4 LBS | BODY MASS INDEX: 22.51 KG/M2 | SYSTOLIC BLOOD PRESSURE: 128 MMHG

## 2022-12-12 DIAGNOSIS — E11.65 TYPE 2 DIABETES MELLITUS WITH HYPERGLYCEMIA, WITHOUT LONG-TERM CURRENT USE OF INSULIN: Chronic | ICD-10-CM

## 2022-12-12 DIAGNOSIS — I10 ESSENTIAL HYPERTENSION: Chronic | ICD-10-CM

## 2022-12-12 DIAGNOSIS — C61 PROSTATE CANCER: Chronic | ICD-10-CM

## 2022-12-12 DIAGNOSIS — E78.2 MIXED HYPERLIPIDEMIA: Primary | Chronic | ICD-10-CM

## 2022-12-12 PROCEDURE — 99214 OFFICE O/P EST MOD 30 MIN: CPT | Performed by: NURSE PRACTITIONER

## 2022-12-12 NOTE — PROGRESS NOTES
"Subjective   Lui Miner is a 67 y.o. male who presents to the clinic to establish care from Dr. Hassan.     The patient reports he has had 5 kidney stones in the past, revealing his last kidney stone was in 04/2022. He states he has had a hernia surgery on the right side of his groin. He mentions he had prostate cancer in the summer of 2021, and had his prostate taken out. The patient mentions he used to work at [employer].  He confirms that he has high blood pressure, high cholesterol, and diabetes but denies having Alpha gal any longer. The patient denies taking insulin, or experiencing any reflux or heartburn. He mentions he does not need refills on his blood pressure medication or any of his other medications prescribed. He mentions he sees Dr. Ko Camacho for his prostate issues, urology in [place], [place].     The patient denies any chest pain, headache, or shortness of breath. He adds he retired 6 years ago and mentions he has been working part-time up until 12/06/2022. He adds he rides a bike often and notes he rode 7 miles last week. The patient denies following a diabetic diet, smoking or drinking alcohol.      Vitals:    12/12/22 0821   BP: 128/70   Pulse: 63   Temp: 97 °F (36.1 °C)   SpO2: 98%   Weight: 65 kg (143 lb 6.4 oz)   Height: 170.2 cm (67\")   PainSc: 0-No pain     Body mass index is 22.46 kg/m².  Past Medical History:   Diagnosis Date   • Essential hypertension    • Mixed hyperlipidemia    • Type 2 diabetes mellitus without complication, without long-term current use of insulin ([facility])      History of Present Illness     The following portions of the patient's history were reviewed and updated as appropriate: allergies, current medications, past family history, past medical history, past social history, past surgical history and problem list.    Review of Systems    Objective   Physical Exam  Vitals and nursing note reviewed.   Constitutional:       General: He is not in acute distress.     " Appearance: Normal appearance. He is normal weight. He is not ill-appearing, toxic-appearing or diaphoretic.   HENT:      Head: Normocephalic and atraumatic.   Neck:      Vascular: No carotid bruit.   Cardiovascular:      Rate and Rhythm: Normal rate and regular rhythm.      Pulses: Normal pulses.      Heart sounds: Normal heart sounds. No murmur heard.    No friction rub. No gallop.   Pulmonary:      Effort: Pulmonary effort is normal. No respiratory distress.      Breath sounds: Normal breath sounds. No stridor. No wheezing, rhonchi or rales.   Abdominal:      General: Bowel sounds are normal. There is no distension.      Palpations: Abdomen is soft. There is no mass.      Tenderness: There is no abdominal tenderness. There is no guarding.      Hernia: No hernia is present.      Comments: Abdomen is soft, rounded, nontender to palpation throughout. No masses noted.   Musculoskeletal:      Cervical back: Neck supple.      Right lower leg: No edema.      Left lower leg: No edema.   Skin:     General: Skin is warm and dry.      Coloration: Skin is not jaundiced or pale.      Findings: No bruising, erythema, lesion or rash.   Neurological:      Mental Status: He is alert and oriented to person, place, and time.      Cranial Nerves: No cranial nerve deficit.      Motor: No weakness.      Coordination: Coordination normal.      Gait: Gait normal.   Psychiatric:         Mood and Affect: Mood normal.         Behavior: Behavior normal.         Thought Content: Thought content normal.         Judgment: Judgment normal.        Assessment & Plan   Diagnoses and all orders for this visit:    1. Mixed hyperlipidemia (Primary)    2. Essential hypertension    3. Type 2 diabetes mellitus with hyperglycemia, without long-term current use of insulin (HCC)    4. Prostate cancer (HCC)    Plan:     Laboratory results are discussed with the patient and copies of the results are given to him. No refills needed on any of the patient's  medications currently prescribed, but he will call the clinic back when the medications are due. Immunizations are offered, but declined. The patient will follow up in 6 months for recheck, sooner if needed. All questions and concerns addressed with understanding noted. He is aware and is in agreement to this plan.     BMI is within normal parameters. No other follow-up for BMI required.     Transcribed from ambient dictation for PAU De Jesus by Yasemin Escalera.  12/12/22   09:50 CST

## 2022-12-12 NOTE — PROGRESS NOTES
"Subjective   Lui Miner is a 67 y.o. male who presents to the clinic to establish care from Dr. Hassan.     The patient reports he has had 5 kidney stones in the past, revealing his last kidney stone was in 04/2022. He states he has had a hernia surgery on the right side of his groin. He mentions he had prostate cancer in the summer of 2021, and had his prostate taken out. The patient mentions he used to work at [employer].  He confirms that he has high blood pressure, high cholesterol, and diabetes but denies having Alpha gal any longer. The patient denies taking insulin, or experiencing any reflux or heartburn. He mentions he does not need refills on his blood pressure medication or any of his other medications prescribed. He mentions he sees Dr. Ko Camacho for his prostate issues, urology in [place], [place].     The patient denies any chest pain, headache, or shortness of breath. He adds he retired 6 years ago and mentions he has been working part-time up until 12/05/2022. He adds he rides a bike often and notes he rode 7 miles last week. The patient denies following a diabetic diet, smoking or drinking alcohol.      Vitals:    12/12/22 0821   BP: 128/70   Pulse: 63   Temp: 97 °F (36.1 °C)   SpO2: 98%   Weight: 65 kg (143 lb 6.4 oz)   Height: 170.2 cm (67\")   PainSc: 0-No pain     Body mass index is 22.46 kg/m².  Past Medical History:   Diagnosis Date   • Essential hypertension    • Mixed hyperlipidemia    • Type 2 diabetes mellitus without complication, without long-term current use of insulin ([facility])      History of Present Illness     The following portions of the patient's history were reviewed and updated as appropriate: allergies, current medications, past family history, past medical history, past social history, past surgical history and problem list.    Review of Systems    Objective   Physical Exam  Vitals and nursing note reviewed.   Constitutional:       General: He is not in acute distress.     " Appearance: Normal appearance. He is normal weight. He is not ill-appearing, toxic-appearing or diaphoretic.   HENT:      Head: Normocephalic and atraumatic.   Neck:      Vascular: No carotid bruit.   Cardiovascular:      Rate and Rhythm: Normal rate and regular rhythm.      Pulses: Normal pulses.      Heart sounds: Normal heart sounds. No murmur heard.    No friction rub. No gallop.   Pulmonary:      Effort: Pulmonary effort is normal. No respiratory distress.      Breath sounds: Normal breath sounds. No stridor. No wheezing, rhonchi or rales.   Abdominal:      General: Bowel sounds are normal. There is no distension.      Palpations: Abdomen is soft. There is no mass.      Tenderness: There is no abdominal tenderness. There is no guarding.      Hernia: No hernia is present.      Comments: Abdomen is soft, rounded, nontender to palpation throughout. No masses noted.   Musculoskeletal:      Cervical back: Neck supple.      Right lower leg: No edema.      Left lower leg: No edema.   Skin:     General: Skin is warm and dry.      Coloration: Skin is not jaundiced or pale.      Findings: No bruising, erythema, lesion or rash.   Neurological:      Mental Status: He is alert and oriented to person, place, and time.      Cranial Nerves: No cranial nerve deficit.      Motor: No weakness.      Coordination: Coordination normal.      Gait: Gait normal.   Psychiatric:         Mood and Affect: Mood normal.         Behavior: Behavior normal.         Thought Content: Thought content normal.         Judgment: Judgment normal.        Assessment & Plan   Diagnoses and all orders for this visit:    1. Mixed hyperlipidemia (Primary)    2. Essential hypertension    3. Type 2 diabetes mellitus with hyperglycemia, without long-term current use of insulin (HCC)    4. Prostate cancer (HCC)    Plan:     Laboratory results are discussed with the patient and copies of the results are given to him. No refills needed on any of the patient's  medications currently prescribed, but he will call the clinic back when the medications are due. Immunizations are offered, but declined. The patient will follow up in 6 months for recheck, sooner if needed. All questions and concerns addressed with understanding noted. He is aware and is in agreement to this plan.     BMI is within normal parameters. No other follow-up for BMI required.     Transcribed from ambient dictation for PAU De Jesus by Yasemin Escalera.  12/12/22   09:50 CST

## 2022-12-20 DIAGNOSIS — E11.65 TYPE 2 DIABETES MELLITUS WITH HYPERGLYCEMIA, WITHOUT LONG-TERM CURRENT USE OF INSULIN: Chronic | ICD-10-CM

## 2022-12-20 RX ORDER — LANCETS 33 GAUGE
EACH MISCELLANEOUS
Qty: 100 EACH | Refills: 3 | Status: SHIPPED | OUTPATIENT
Start: 2022-12-20

## 2023-01-06 ENCOUNTER — PATIENT ROUNDING (BHMG ONLY) (OUTPATIENT)
Dept: FAMILY MEDICINE CLINIC | Facility: CLINIC | Age: 68
End: 2023-01-06
Payer: MEDICARE

## 2023-01-16 DIAGNOSIS — E11.65 TYPE 2 DIABETES MELLITUS WITH HYPERGLYCEMIA, WITHOUT LONG-TERM CURRENT USE OF INSULIN: Chronic | ICD-10-CM

## 2023-02-16 DIAGNOSIS — E11.65 TYPE 2 DIABETES MELLITUS WITH HYPERGLYCEMIA, WITHOUT LONG-TERM CURRENT USE OF INSULIN: Chronic | ICD-10-CM

## 2023-03-01 DIAGNOSIS — E11.65 TYPE 2 DIABETES MELLITUS WITH HYPERGLYCEMIA, WITHOUT LONG-TERM CURRENT USE OF INSULIN: Chronic | ICD-10-CM

## 2023-03-02 DIAGNOSIS — E11.65 TYPE 2 DIABETES MELLITUS WITH HYPERGLYCEMIA, WITHOUT LONG-TERM CURRENT USE OF INSULIN: Chronic | ICD-10-CM

## 2023-03-02 RX ORDER — GLIMEPIRIDE 1 MG/1
1 TABLET ORAL DAILY
Qty: 90 TABLET | Refills: 3 | OUTPATIENT
Start: 2023-03-02

## 2023-03-02 RX ORDER — GLIMEPIRIDE 1 MG/1
1 TABLET ORAL DAILY
Qty: 90 TABLET | Refills: 1 | Status: SHIPPED | OUTPATIENT
Start: 2023-03-02

## 2023-03-02 NOTE — TELEPHONE ENCOUNTER
Refill request was sent to Dr Hassan. Patient's PCP is PAU Alicia. I sent the refill request to her staff.

## 2023-03-16 DIAGNOSIS — I10 ESSENTIAL HYPERTENSION: Chronic | ICD-10-CM

## 2023-03-20 RX ORDER — LOSARTAN POTASSIUM 50 MG/1
50 TABLET ORAL DAILY
Qty: 90 TABLET | Refills: 3 | OUTPATIENT
Start: 2023-03-20

## 2023-03-21 DIAGNOSIS — I10 ESSENTIAL HYPERTENSION: Chronic | ICD-10-CM

## 2023-03-21 RX ORDER — LOSARTAN POTASSIUM 50 MG/1
50 TABLET ORAL DAILY
Qty: 90 TABLET | Refills: 1 | Status: SHIPPED | OUTPATIENT
Start: 2023-03-21

## 2023-05-01 RX ORDER — LINAGLIPTIN 5 MG/1
TABLET, FILM COATED ORAL
Qty: 30 TABLET | Refills: 5 | OUTPATIENT
Start: 2023-05-01

## 2023-05-30 DIAGNOSIS — E78.2 MIXED HYPERLIPIDEMIA: ICD-10-CM

## 2023-05-30 DIAGNOSIS — Z12.5 SCREENING FOR PROSTATE CANCER: ICD-10-CM

## 2023-05-30 DIAGNOSIS — I10 ESSENTIAL HYPERTENSION: Primary | ICD-10-CM

## 2023-05-30 DIAGNOSIS — E11.65 TYPE 2 DIABETES MELLITUS WITH HYPERGLYCEMIA, WITHOUT LONG-TERM CURRENT USE OF INSULIN: ICD-10-CM

## 2023-06-05 DIAGNOSIS — E11.65 TYPE 2 DIABETES MELLITUS WITH HYPERGLYCEMIA, WITHOUT LONG-TERM CURRENT USE OF INSULIN: Chronic | ICD-10-CM

## 2023-06-05 RX ORDER — GLIMEPIRIDE 1 MG/1
1 TABLET ORAL DAILY
Qty: 90 TABLET | Refills: 1 | OUTPATIENT
Start: 2023-06-05

## 2023-06-14 ENCOUNTER — OFFICE VISIT (OUTPATIENT)
Dept: FAMILY MEDICINE CLINIC | Facility: CLINIC | Age: 68
End: 2023-06-14
Payer: MEDICARE

## 2023-06-14 VITALS
DIASTOLIC BLOOD PRESSURE: 70 MMHG | OXYGEN SATURATION: 98 % | BODY MASS INDEX: 22.7 KG/M2 | HEART RATE: 64 BPM | WEIGHT: 144.6 LBS | SYSTOLIC BLOOD PRESSURE: 120 MMHG | HEIGHT: 67 IN

## 2023-06-14 DIAGNOSIS — E78.2 MIXED HYPERLIPIDEMIA: Primary | ICD-10-CM

## 2023-06-14 DIAGNOSIS — E11.65 TYPE 2 DIABETES MELLITUS WITH HYPERGLYCEMIA, WITHOUT LONG-TERM CURRENT USE OF INSULIN: ICD-10-CM

## 2023-06-14 DIAGNOSIS — E78.2 MIXED HYPERLIPIDEMIA: Chronic | ICD-10-CM

## 2023-06-14 DIAGNOSIS — Z00.00 MEDICARE ANNUAL WELLNESS VISIT, SUBSEQUENT: Primary | ICD-10-CM

## 2023-06-14 DIAGNOSIS — E11.65 TYPE 2 DIABETES MELLITUS WITH HYPERGLYCEMIA, WITHOUT LONG-TERM CURRENT USE OF INSULIN: Chronic | ICD-10-CM

## 2023-06-14 DIAGNOSIS — I10 ESSENTIAL HYPERTENSION: ICD-10-CM

## 2023-06-14 DIAGNOSIS — I10 ESSENTIAL HYPERTENSION: Chronic | ICD-10-CM

## 2023-06-14 RX ORDER — ATORVASTATIN CALCIUM 20 MG/1
10 TABLET, FILM COATED ORAL NIGHTLY
Qty: 45 TABLET | Refills: 3 | Status: SHIPPED | OUTPATIENT
Start: 2023-06-14

## 2023-06-14 NOTE — PROGRESS NOTES
The ABCs of the Annual Wellness Visit  Subsequent Medicare Wellness Visit    Subjective    Lui Miner is a 68 y.o. male who presents for a Subsequent Medicare Wellness Visit.    The following portions of the patient's history were reviewed and   updated as appropriate: allergies, current medications, past family history, past medical history, past social history, past surgical history, and problem list.    Compared to one year ago, the patient feels his physical   health is the same.    Compared to one year ago, the patient feels his mental   health is the same.    Recent Hospitalizations:  He was not admitted to the hospital during the last year.       Current Medical Providers:  Patient Care Team:  Maryjane Mendoza APRN as PCP - General (Family Medicine)  Mannie Collins DPM as Consulting Physician (Podiatry)  Dusty Anton OD as Consulting Physician (Optometry)  Ko Montero MD as Consulting Physician (Urology)    Outpatient Medications Prior to Visit   Medication Sig Dispense Refill    Blood Glucose Monitoring Suppl (ACURA BLOOD GLUCOSE METER) W/DEVICE kit to test FSBS 1 time per day as directed.DX:DM/E11.9, #1 glucometer, #50 strips, #100 lancets      glimepiride (AMARYL) 1 MG tablet TAKE 1 TABLET BY MOUTH DAILY. 90 tablet 1    glucose blood test strip Use to check fsbs daily 50 each 11    Lancets (OneTouch Delica Plus Ikewpp90G) misc CHECK DAILY 100 each 3    linagliptin (Tradjenta) 5 MG tablet tablet Take 1 tablet by mouth Daily. 30 tablet 5    losartan (COZAAR) 50 MG tablet Take 1 tablet by mouth Daily. 90 tablet 1    atorvastatin (LIPITOR) 20 MG tablet Take 0.5 tablets by mouth Every Night. 45 tablet 3     No facility-administered medications prior to visit.       No opioid medication identified on active medication list. I have reviewed chart for other potential  high risk medication/s and harmful drug interactions in the elderly.        Aspirin is not on active medication list.  Aspirin use  "is indicated based on review of current medical condition/s. Pros and cons of this therapy have been discussed with this patient. Benefits of this medication outweigh potential harm.  Patient has been instructed to start taking this medication..    Patient Active Problem List   Diagnosis    Type 2 diabetes mellitus with hyperglycemia, without long-term current use of insulin    Mixed hyperlipidemia    Essential hypertension    Chronic urticaria    Elevated PSA    Gastroesophageal reflux disease without esophagitis    Allergy to alpha-gal    Prostate cancer     Advance Care Planning   Advance Care Planning     Advance Directive is not on file.  ACP discussion was held with the patient during this visit. Patient does not have an advance directive, information provided.     Objective    Vitals:    23 0841   BP: 120/70   Pulse: 64   SpO2: 98%   Weight: 65.6 kg (144 lb 9.6 oz)   Height: 170.2 cm (67\")   PainSc: 0-No pain     Estimated body mass index is 22.65 kg/m² as calculated from the following:    Height as of this encounter: 170.2 cm (67\").    Weight as of this encounter: 65.6 kg (144 lb 9.6 oz).    BMI is within normal parameters. No other follow-up for BMI required.      Does the patient have evidence of cognitive impairment? No    Lab Results   Component Value Date     (H) 2023    LDL 80 2023    HGBA1C 6.9 (H) 2023        HEALTH RISK ASSESSMENT    Smoking Status:  Social History     Tobacco Use   Smoking Status Never   Smokeless Tobacco Never     Alcohol Consumption:  Social History     Substance and Sexual Activity   Alcohol Use No     Fall Risk Screen:    STEADI Fall Risk Assessment was completed, and patient is at LOW risk for falls.Assessment completed on:2023    Depression Screenin/14/2023     8:43 AM   PHQ-2/PHQ-9 Depression Screening   Little Interest or Pleasure in Doing Things 0-->not at all   Feeling Down, Depressed or Hopeless 0-->not at all   PHQ-9: Brief " Depression Severity Measure Score 0       Health Habits and Functional and Cognitive Screenin/14/2023     8:00 AM   Functional & Cognitive Status   Do you have difficulty bathing yourself, getting dressed or grooming yourself? No   Do you have difficulty using the toilet? No   Do you have difficulty moving around from place to place? No   Do you have trouble with steps or getting out of a bed or a chair? No   Current Diet Well Balanced Diet   Dental Exam Not up to date   Eye Exam Not up to date   Exercise (times per week) 6 times per week   Current Exercises Include Walking;Yard Work   Do you need help using the phone?  No   Are you deaf or do you have serious difficulty hearing?  No   Do you need help with transportation? No   Do you need help shopping? No   Do you need help preparing meals?  No   Do you need help with housework?  No   Do you need help with laundry? No   Do you need help taking your medications? No   Do you need help managing money? No   Do you ever drive or ride in a car without wearing a seat belt? No   Have you felt unusual stress, anger or loneliness in the last month? No   Who do you live with? Spouse   If you need help, do you have trouble finding someone available to you? No   Have you been bothered in the last four weeks by sexual problems? No   Do you have difficulty concentrating, remembering or making decisions? No       Age-appropriate Screening Schedule:  Refer to the list below for future screening recommendations based on patient's age, sex and/or medical conditions. Orders for these recommended tests are listed in the plan section. The patient has been provided with a written plan.    Health Maintenance   Topic Date Due    DIABETIC FOOT EXAM  2018    URINE MICROALBUMIN  2023    COVID-19 Vaccine (1) 2023 (Originally 1955)    DIABETIC EYE EXAM  2023 (Originally 2/10/2023)    ZOSTER VACCINE (1 of 2) 2023 (Originally 3/17/2004)    INFLUENZA  "VACCINE  10/01/2023    HEMOGLOBIN A1C  12/02/2023    PROSTATE CANCER SCREENING  01/09/2024    LIPID PANEL  06/02/2024    ANNUAL WELLNESS VISIT  06/14/2024    TDAP/TD VACCINES (2 - Td or Tdap) 11/06/2024    COLORECTAL CANCER SCREENING  01/18/2027    HEPATITIS C SCREENING  Completed    Pneumococcal Vaccine 65+  Completed                  CMS Preventative Services Quick Reference  Risk Factors Identified During Encounter  Fall Risk-High or Moderate: Discussed Fall Prevention in the home  Immunizations Discussed/Encouraged:  UTD  Polypharmacy: Medication List reviewed and Medications are appropriate for patient  Dental Screening Recommended  Vision Screening Recommended  The above risks/problems have been discussed with the patient.  Pertinent information has been shared with the patient in the After Visit Summary.  An After Visit Summary and PPPS were made available to the patient.    Follow Up:   Next Medicare Wellness visit to be scheduled in 1 year.       Additional E&M Note during same encounter follows:  Patient has multiple medical problems which are significant and separately identifiable that require additional work above and beyond the Medicare Wellness Visit.      Chief Complaint  Diabetes (6 month follow up) and Medicare Wellness-subsequent    Subjective        HPI  Lui Miner is also being seen today for recheck of htn, diabetes, hyperlipidemia. Needing refills on zocor today, denies complaints . States he is active at home working in his garden, riding his bike for exercise , last week he states he rode 8 miles without difficulties. Denies chest pain, shortness of breath, headache. Reports when he checks his FSBS it is running around 120s on average. Reports he feels well with no complaints today.       Objective   Vital Signs:  /70   Pulse 64   Ht 170.2 cm (67\")   Wt 65.6 kg (144 lb 9.6 oz)   SpO2 98%   BMI 22.65 kg/m²     Physical Exam  Vitals and nursing note reviewed. "   Constitutional:       General: He is not in acute distress.     Appearance: Normal appearance. He is normal weight. He is not ill-appearing, toxic-appearing or diaphoretic.   HENT:      Head: Normocephalic and atraumatic.   Neck:      Vascular: No carotid bruit.   Cardiovascular:      Rate and Rhythm: Normal rate and regular rhythm.      Heart sounds: Normal heart sounds. No murmur heard.    No friction rub. No gallop.   Pulmonary:      Effort: Pulmonary effort is normal. No respiratory distress.      Breath sounds: Normal breath sounds. No stridor. No wheezing, rhonchi or rales.   Musculoskeletal:      Right lower leg: No edema.      Left lower leg: No edema.   Skin:     General: Skin is warm and dry.      Coloration: Skin is not jaundiced or pale.      Findings: No bruising, erythema, lesion or rash.   Neurological:      Mental Status: He is alert and oriented to person, place, and time.      Cranial Nerves: No cranial nerve deficit.      Motor: No weakness.      Coordination: Coordination normal.      Gait: Gait normal.   Psychiatric:         Mood and Affect: Mood normal.         Behavior: Behavior normal.         Thought Content: Thought content normal.         Judgment: Judgment normal.        The following data was reviewed by: PAU De Jesus on 06/14/2023:  CMP          7/15/2022    10:53 12/5/2022    07:53 6/2/2023    09:41   CMP   Glucose 131     120     153       BUN 15     19     17       Creatinine 0.9     1.0     1.0       Sodium 140     143     140       Potassium 4.1     4.3     3.9       Chloride 105     105     104       Calcium 9.1     9.0     8.6       Total Protein  7.0     6.9       Albumin  4.1     4.0       Total Bilirubin  1.40     0.90       Alkaline Phosphatase  104     89       AST (SGOT)  15     10       ALT (SGPT)  12     22       Anion Gap 5            Details          This result is from an external source.             CBC          7/23/2022    05:16   CBC   Hemoglobin 14.6        Hematocrit 41.7          Details          This result is from an external source.             CBC w/diff          7/23/2022    05:16   CBC w/Diff   Hemoglobin 14.6       Hematocrit 41.7          Details          This result is from an external source.             Lipid Panel          12/5/2022    07:53 6/2/2023    09:41   Lipid Panel   LDL Cholesterol  90     80          Details          This result is from an external source.                            Assessment and Plan   Diagnoses and all orders for this visit:    1. Medicare annual wellness visit, subsequent (Primary)    2. Mixed hyperlipidemia  -     atorvastatin (LIPITOR) 20 MG tablet; Take 0.5 tablets by mouth Every Night.  Dispense: 45 tablet; Refill: 3    3. Essential hypertension    4. Type 2 diabetes mellitus with hyperglycemia, without long-term current use of insulin    Subsequent medicare wellness completed, immunizations reviewed. Labs from 6-2-23 reviewed today, copies offered, he is advised to start 81ms ASA daily due to multi risk factors of CAD. Advised on potential side effects and if he notices easy bruising, bleeding he is asked to let me know.  He will RTC in 6 months for recheck or sooner if needed. Refills given to him on lipitor today. Will order urine microalbumin for him to obtain with his next set of labs, 6 months. All questions and concerns addressed with understanding noted. He is aware and is in agreement to this plan.     I spent 25 minutes caring for Lui on this date of service. This time includes time spent by me in the following activities:preparing for the visit, reviewing tests, performing a medically appropriate examination and/or evaluation , counseling and educating the patient/family/caregiver, ordering medications, tests, or procedures, and documenting information in the medical record  Follow Up   No follow-ups on file.  Patient was given instructions and counseling regarding his condition or for health maintenance  advice. Please see specific information pulled into the AVS if appropriate.

## 2023-08-31 DIAGNOSIS — E11.65 TYPE 2 DIABETES MELLITUS WITH HYPERGLYCEMIA, WITHOUT LONG-TERM CURRENT USE OF INSULIN: Chronic | ICD-10-CM

## 2023-08-31 RX ORDER — GLIMEPIRIDE 1 MG/1
1 TABLET ORAL DAILY
Qty: 90 TABLET | Refills: 1 | Status: SHIPPED | OUTPATIENT
Start: 2023-08-31

## 2023-09-06 ENCOUNTER — OFFICE VISIT (OUTPATIENT)
Dept: FAMILY MEDICINE CLINIC | Facility: CLINIC | Age: 68
End: 2023-09-06
Payer: MEDICARE

## 2023-09-06 VITALS
HEART RATE: 69 BPM | OXYGEN SATURATION: 97 % | TEMPERATURE: 97 F | BODY MASS INDEX: 22.7 KG/M2 | HEIGHT: 67 IN | WEIGHT: 144.6 LBS | DIASTOLIC BLOOD PRESSURE: 82 MMHG | SYSTOLIC BLOOD PRESSURE: 136 MMHG
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
## 2023-09-06 PROCEDURE — 1159F MED LIST DOCD IN RCRD: CPT | Performed by: NURSE PRACTITIONER

## 2023-09-06 PROCEDURE — 1160F RVW MEDS BY RX/DR IN RCRD: CPT | Performed by: NURSE PRACTITIONER

## 2023-09-06 PROCEDURE — 3079F DIAST BP 80-89 MM HG: CPT | Performed by: NURSE PRACTITIONER

## 2023-09-06 PROCEDURE — 3075F SYST BP GE 130 - 139MM HG: CPT | Performed by: NURSE PRACTITIONER

## 2023-09-06 PROCEDURE — 99213 OFFICE O/P EST LOW 20 MIN: CPT | Performed by: NURSE PRACTITIONER

## 2023-09-06 RX ORDER — DOXYCYCLINE HYCLATE 100 MG/1
100 CAPSULE ORAL 2 TIMES DAILY
Qty: 42 CAPSULE | Refills: 0 | Status: SHIPPED | OUTPATIENT
Start: 2023-09-06 | End: 2023-09-27

## 2023-09-06 NOTE — PROGRESS NOTES
"Chief Complaint  Bite (Tick bite/)    Subjective        Lui Miner presents to [facility]  Bite    Patient here today with complaints of 70+ tick bites, deer ticks, that he removed on Monday afternoon after he had been walking through his yard without boots on.  He states he has had alpha-gal x2 already.  Denies fever chills joint pain neck pain headache but is concerned because of his history with tick diseases in the past.  Would like to be treated prophylactically due to his history and due to the excessive amount of ticks that he removed on Monday.    Objective   Vital Signs:  /82   Pulse 69   Temp 97 °F (36.1 °C)   Ht 170.2 cm (67\")   Wt 65.6 kg (144 lb 9.6 oz)   SpO2 97%   BMI 22.65 kg/m²   Estimated body mass index is 22.65 kg/m² as calculated from the following:    Height as of this encounter: 170.2 cm (67\").    Weight as of this encounter: 65.6 kg (144 lb 9.6 oz).     BMI is within normal parameters. No other follow-up for BMI required.    Physical Exam  Vitals and nursing note reviewed.   Constitutional:       General: He is not in acute distress.     Appearance: Normal appearance. He is not ill-appearing, toxic-appearing or diaphoretic.   HENT:      Head: Normocephalic and atraumatic.   Cardiovascular:      Rate and Rhythm: Normal rate and regular rhythm.      Heart sounds: Normal heart sounds. No murmur heard.    No friction rub. No gallop.   Pulmonary:      Effort: Pulmonary effort is normal. No respiratory distress.      Breath sounds: Normal breath sounds. No stridor. No wheezing, rhonchi or rales.   Skin:     General: Skin is warm and dry.      Findings: Erythema present. Rash is macular, papular and urticarial.      Comments: Too numerous to count individualized erythematous lesions where he has removed ticks clustered around his ankles bilaterally waist as well as thighs bilaterally, anteriorly.   Neurological:      Mental Status: " He is alert and oriented to person, place, and time.      Cranial Nerves: No cranial nerve deficit.      Motor: No weakness.      Coordination: Coordination normal.      Gait: Gait normal.   Psychiatric:         Mood and Affect: Mood normal.         Behavior: Behavior normal.         Thought Content: Thought content normal.         Judgment: Judgment normal.      Result Review :               Assessment and Plan   Diagnoses and all orders for this visit:    1. Tick bite, unspecified site, initial encounter (Primary)  Comments:  [facility]  Orders:  -     doxycycline (VIBRAMYCIN) 100 MG capsule; Take 1 capsule by mouth 2 (Two) Times a Day for 21 days.  Dispense: 42 capsule; Refill: 0    Due to his history of alpha gal x2 already he is treated with doxycycline as above and he is advised on potential side effects medication, advised how to take and advised to wear sunscreen when out in the sun while on the medicine.  He is offered but declined labs including tick titers.  Should his symptoms of neck pain joint pain and headache occur that he will return here or go to ER or urgent care if needed.  Otherwise, I will see him back as scheduled for chronic conditions.  All questions and concerns addressed with understanding verbalized.  Patient aware and in agreement to this plan.     I spent 22 minutes caring for Lui on this date of service. This time includes time spent by me in the following activities:preparing for the visit, performing a medically appropriate examination and/or evaluation , counseling and educating the patient/family/caregiver, ordering medications, tests, or procedures, and documenting information in the medical record  Follow Up   Return if symptoms worsen or fail to improve, for Recheck, or sooner as needed.  Patient was given instructions and counseling regarding his condition or for health maintenance advice. Please see specific information pulled into the AVS if appropriate.

## 2023-09-13 DIAGNOSIS — I10 ESSENTIAL HYPERTENSION: Chronic | ICD-10-CM

## 2023-09-13 RX ORDER — LOSARTAN POTASSIUM 50 MG/1
50 TABLET ORAL DAILY
Qty: 90 TABLET | Refills: 1 | Status: SHIPPED | OUTPATIENT
Start: 2023-09-13

## 2023-11-11 NOTE — PROGRESS NOTES
"January 6, 2023    Hello, may I speak with Lui Miner?    My name is the Clinical Coordinator      I am  with JOAN PATIÑO  [facility] - [address] [place] [id number] [phone].    Before we get started may I verify your date of birth? 1955    Spoke to pt in office at appt visit on 12/12/2022  to officially welcome you to our practice and ask about your recent visit. Is this a good time to talk? yes    Tell me about your visit with us. What things went well?  \"It was good\".       We're always looking for ways to make our patients' experiences even better. Do you have recommendations on ways we may improve?  no    Overall were you satisfied with your first visit to our practice? yes       I appreciate you taking the time to speak with me today. Is there anything else I can do for you? yes      Thank you, and have a great day.      " Never